# Patient Record
Sex: FEMALE | Race: WHITE | Employment: OTHER | URBAN - METROPOLITAN AREA
[De-identification: names, ages, dates, MRNs, and addresses within clinical notes are randomized per-mention and may not be internally consistent; named-entity substitution may affect disease eponyms.]

---

## 2017-01-04 ENCOUNTER — TELEPHONE (OUTPATIENT)
Dept: FAMILY MEDICINE CLINIC | Age: 72
End: 2017-01-04

## 2017-01-04 DIAGNOSIS — Z12.31 ENCOUNTER FOR SCREENING MAMMOGRAM FOR MALIGNANT NEOPLASM OF BREAST: Primary | ICD-10-CM

## 2017-01-06 RX ORDER — REPAGLINIDE 2 MG/1
TABLET ORAL
Qty: 90 TABLET | Refills: 3 | Status: SHIPPED | OUTPATIENT
Start: 2017-01-06 | End: 2017-05-15 | Stop reason: SDUPTHER

## 2017-01-13 RX ORDER — LOSARTAN POTASSIUM 100 MG/1
TABLET ORAL
Qty: 90 TABLET | Refills: 0 | Status: SHIPPED | OUTPATIENT
Start: 2017-01-13 | End: 2017-04-10 | Stop reason: SDUPTHER

## 2017-01-16 ENCOUNTER — HOSPITAL ENCOUNTER (OUTPATIENT)
Dept: WOMENS IMAGING | Age: 72
Discharge: HOME | End: 2017-01-16
Payer: MEDICARE

## 2017-01-16 DIAGNOSIS — Z12.31 ENCOUNTER FOR SCREENING MAMMOGRAM FOR MALIGNANT NEOPLASM OF BREAST: ICD-10-CM

## 2017-01-16 PROCEDURE — G0202 SCR MAMMO BI INCL CAD: HCPCS

## 2017-04-10 RX ORDER — LOSARTAN POTASSIUM 100 MG/1
TABLET ORAL
Qty: 90 TABLET | Refills: 1 | Status: SHIPPED | OUTPATIENT
Start: 2017-04-10 | End: 2017-10-06 | Stop reason: SDUPTHER

## 2017-04-18 ENCOUNTER — OFFICE VISIT (OUTPATIENT)
Dept: FAMILY MEDICINE CLINIC | Age: 72
End: 2017-04-18

## 2017-04-18 VITALS
WEIGHT: 124 LBS | DIASTOLIC BLOOD PRESSURE: 70 MMHG | BODY MASS INDEX: 24.35 KG/M2 | SYSTOLIC BLOOD PRESSURE: 132 MMHG | RESPIRATION RATE: 12 BRPM | HEIGHT: 60 IN | HEART RATE: 70 BPM | TEMPERATURE: 97.2 F

## 2017-04-18 DIAGNOSIS — E55.9 VITAMIN D DEFICIENCY: ICD-10-CM

## 2017-04-18 DIAGNOSIS — I77.9 CAROTID DISEASE, BILATERAL (HCC): ICD-10-CM

## 2017-04-18 DIAGNOSIS — E78.2 MIXED HYPERLIPIDEMIA: ICD-10-CM

## 2017-04-18 DIAGNOSIS — I10 ESSENTIAL HYPERTENSION: ICD-10-CM

## 2017-04-18 DIAGNOSIS — D64.9 ANEMIA, UNSPECIFIED TYPE: ICD-10-CM

## 2017-04-18 PROCEDURE — 1036F TOBACCO NON-USER: CPT | Performed by: FAMILY MEDICINE

## 2017-04-18 PROCEDURE — 99214 OFFICE O/P EST MOD 30 MIN: CPT | Performed by: FAMILY MEDICINE

## 2017-04-18 PROCEDURE — 3044F HG A1C LEVEL LT 7.0%: CPT | Performed by: FAMILY MEDICINE

## 2017-04-18 PROCEDURE — 1090F PRES/ABSN URINE INCON ASSESS: CPT | Performed by: FAMILY MEDICINE

## 2017-04-18 PROCEDURE — 4040F PNEUMOC VAC/ADMIN/RCVD: CPT | Performed by: FAMILY MEDICINE

## 2017-04-18 PROCEDURE — G8420 CALC BMI NORM PARAMETERS: HCPCS | Performed by: FAMILY MEDICINE

## 2017-04-18 PROCEDURE — G8427 DOCREV CUR MEDS BY ELIG CLIN: HCPCS | Performed by: FAMILY MEDICINE

## 2017-04-18 PROCEDURE — G8400 PT W/DXA NO RESULTS DOC: HCPCS | Performed by: FAMILY MEDICINE

## 2017-04-18 PROCEDURE — 1123F ACP DISCUSS/DSCN MKR DOCD: CPT | Performed by: FAMILY MEDICINE

## 2017-04-18 PROCEDURE — 3014F SCREEN MAMMO DOC REV: CPT | Performed by: FAMILY MEDICINE

## 2017-04-18 PROCEDURE — 3017F COLORECTAL CA SCREEN DOC REV: CPT | Performed by: FAMILY MEDICINE

## 2017-04-18 ASSESSMENT — PATIENT HEALTH QUESTIONNAIRE - PHQ9
1. LITTLE INTEREST OR PLEASURE IN DOING THINGS: 0
2. FEELING DOWN, DEPRESSED OR HOPELESS: 0
SUM OF ALL RESPONSES TO PHQ9 QUESTIONS 1 & 2: 0
SUM OF ALL RESPONSES TO PHQ QUESTIONS 1-9: 0

## 2017-05-05 ENCOUNTER — NURSE ONLY (OUTPATIENT)
Dept: FAMILY MEDICINE CLINIC | Age: 72
End: 2017-05-05

## 2017-05-05 DIAGNOSIS — E78.2 MIXED HYPERLIPIDEMIA: ICD-10-CM

## 2017-05-05 DIAGNOSIS — E55.9 VITAMIN D DEFICIENCY: ICD-10-CM

## 2017-05-05 DIAGNOSIS — I10 ESSENTIAL HYPERTENSION: ICD-10-CM

## 2017-05-05 DIAGNOSIS — Z12.11 COLON CANCER SCREENING: Primary | ICD-10-CM

## 2017-05-05 LAB
ALBUMIN SERPL-MCNC: 4.2 G/DL (ref 3.9–4.9)
ALP BLD-CCNC: 52 U/L (ref 40–130)
ALT SERPL-CCNC: 15 U/L (ref 0–33)
ANION GAP SERPL CALCULATED.3IONS-SCNC: 10 MEQ/L (ref 7–13)
AST SERPL-CCNC: 17 U/L (ref 0–35)
BILIRUB SERPL-MCNC: 0.4 MG/DL (ref 0–1.2)
BUN BLDV-MCNC: 15 MG/DL (ref 8–23)
CALCIUM SERPL-MCNC: 8.9 MG/DL (ref 8.6–10.2)
CHLORIDE BLD-SCNC: 104 MEQ/L (ref 98–107)
CHOLESTEROL, TOTAL: 218 MG/DL (ref 0–199)
CO2: 27 MEQ/L (ref 22–29)
CONTROL: PRESENT
CREAT SERPL-MCNC: 0.75 MG/DL (ref 0.5–0.9)
GFR AFRICAN AMERICAN: >60
GFR NON-AFRICAN AMERICAN: >60
GLOBULIN: 2.3 G/DL (ref 2.3–3.5)
GLUCOSE BLD-MCNC: 159 MG/DL (ref 74–109)
HBA1C MFR BLD: 6.9 % (ref 4.8–5.9)
HCT VFR BLD CALC: 40.8 % (ref 37–47)
HDLC SERPL-MCNC: 70 MG/DL (ref 40–59)
HEMOCCULT STL QL: NEGATIVE
HEMOGLOBIN: 13.4 G/DL (ref 12–16)
LDL CHOLESTEROL CALCULATED: 128 MG/DL (ref 0–129)
MCH RBC QN AUTO: 31.1 PG (ref 27–31.3)
MCHC RBC AUTO-ENTMCNC: 32.8 % (ref 33–37)
MCV RBC AUTO: 94.8 FL (ref 82–100)
PDW BLD-RTO: 12.1 % (ref 11.5–14.5)
PLATELET # BLD: 173 K/UL (ref 130–400)
POTASSIUM SERPL-SCNC: 3.8 MEQ/L (ref 3.5–5.1)
RBC # BLD: 4.31 M/UL (ref 4.2–5.4)
SODIUM BLD-SCNC: 141 MEQ/L (ref 132–144)
T4 FREE: 1.07 NG/DL (ref 0.93–1.7)
TOTAL PROTEIN: 6.5 G/DL (ref 6.4–8.1)
TRIGL SERPL-MCNC: 100 MG/DL (ref 0–200)
TSH SERPL DL<=0.05 MIU/L-ACNC: 4.45 UIU/ML (ref 0.27–4.2)
VITAMIN D 25-HYDROXY: 36.3 NG/ML (ref 30–100)
WBC # BLD: 4.3 K/UL (ref 4.8–10.8)

## 2017-05-05 PROCEDURE — 82274 ASSAY TEST FOR BLOOD FECAL: CPT | Performed by: FAMILY MEDICINE

## 2017-05-15 ENCOUNTER — OFFICE VISIT (OUTPATIENT)
Dept: SURGERY | Age: 72
End: 2017-05-15

## 2017-05-15 VITALS
HEART RATE: 81 BPM | SYSTOLIC BLOOD PRESSURE: 170 MMHG | BODY MASS INDEX: 23.95 KG/M2 | WEIGHT: 122 LBS | DIASTOLIC BLOOD PRESSURE: 70 MMHG | HEIGHT: 60 IN

## 2017-05-15 PROCEDURE — 99213 OFFICE O/P EST LOW 20 MIN: CPT | Performed by: INTERNAL MEDICINE

## 2017-05-15 RX ORDER — REPAGLINIDE 2 MG/1
TABLET ORAL
Qty: 270 TABLET | Refills: 3 | Status: SHIPPED | OUTPATIENT
Start: 2017-05-15 | End: 2018-09-10 | Stop reason: SDUPTHER

## 2017-05-20 ENCOUNTER — HOSPITAL ENCOUNTER (OUTPATIENT)
Dept: ULTRASOUND IMAGING | Age: 72
Discharge: HOME | End: 2017-05-20
Payer: MEDICARE

## 2017-05-20 DIAGNOSIS — I77.9 CAROTID DISEASE, BILATERAL (HCC): ICD-10-CM

## 2017-05-20 PROCEDURE — 93880 EXTRACRANIAL BILAT STUDY: CPT

## 2017-05-22 DIAGNOSIS — I65.29 STENOSIS OF CAROTID ARTERY, UNSPECIFIED LATERALITY: Primary | ICD-10-CM

## 2017-05-22 RX ORDER — GLIMEPIRIDE 2 MG/1
TABLET ORAL
Qty: 60 TABLET | Refills: 3 | Status: SHIPPED | OUTPATIENT
Start: 2017-05-22 | End: 2017-09-19 | Stop reason: SDUPTHER

## 2017-07-14 RX ORDER — SITAGLIPTIN 100 MG/1
TABLET, FILM COATED ORAL
Qty: 90 TABLET | Refills: 0 | OUTPATIENT
Start: 2017-07-14

## 2017-07-21 DIAGNOSIS — E78.01 FAMILIAL HYPERCHOLESTEROLEMIA: ICD-10-CM

## 2017-07-21 RX ORDER — PRAVASTATIN SODIUM 20 MG
TABLET ORAL
Qty: 30 TABLET | Refills: 3 | Status: SHIPPED | OUTPATIENT
Start: 2017-07-21 | End: 2018-05-14 | Stop reason: DRUGHIGH

## 2017-09-19 RX ORDER — GLIMEPIRIDE 2 MG/1
TABLET ORAL
Qty: 180 TABLET | Refills: 3 | Status: SHIPPED | OUTPATIENT
Start: 2017-09-19 | End: 2018-08-24 | Stop reason: SDUPTHER

## 2017-09-19 RX ORDER — HYDROCHLOROTHIAZIDE 25 MG/1
25 TABLET ORAL DAILY
Qty: 90 TABLET | Refills: 1 | Status: SHIPPED | OUTPATIENT
Start: 2017-09-19 | End: 2019-02-25 | Stop reason: SDUPTHER

## 2017-10-06 RX ORDER — LOSARTAN POTASSIUM 100 MG/1
TABLET ORAL
Qty: 90 TABLET | Refills: 1 | Status: SHIPPED | OUTPATIENT
Start: 2017-10-06 | End: 2018-04-09 | Stop reason: SDUPTHER

## 2017-10-17 ENCOUNTER — OFFICE VISIT (OUTPATIENT)
Dept: FAMILY MEDICINE CLINIC | Age: 72
End: 2017-10-17

## 2017-10-17 VITALS
WEIGHT: 126 LBS | HEART RATE: 74 BPM | TEMPERATURE: 97.8 F | BODY MASS INDEX: 24.74 KG/M2 | SYSTOLIC BLOOD PRESSURE: 132 MMHG | RESPIRATION RATE: 14 BRPM | DIASTOLIC BLOOD PRESSURE: 68 MMHG | HEIGHT: 60 IN

## 2017-10-17 DIAGNOSIS — I10 ESSENTIAL HYPERTENSION: ICD-10-CM

## 2017-10-17 DIAGNOSIS — I65.21 STENOSIS OF RIGHT CAROTID ARTERY: ICD-10-CM

## 2017-10-17 DIAGNOSIS — E78.2 MIXED HYPERLIPIDEMIA: ICD-10-CM

## 2017-10-17 PROCEDURE — 99214 OFFICE O/P EST MOD 30 MIN: CPT | Performed by: FAMILY MEDICINE

## 2017-10-17 NOTE — PROGRESS NOTES
Chief Complaint   Patient presents with    Diabetes Mellitus    Hypertension    Hyperlipidemia     Lucy Bond is here for a diabetic follow up    TTL0H=0.9 and due    CYH=935    Sugars most recently have been running-   HIGH & LOW<160  Activity level-     advised to increase  The patient denies history of       seizures,             heart attack or KNOWN CAD       or stroke. No chest pain, shortness of breath, paroxysmal nocturnal dyspnea. No nausea, vomiting, diarrhea, hematochezia or melena. No paresthesias or headaches      No dysuria, frequency or hematuria. Past Medical History:   Diagnosis Date    Anemia     Fatigue     Fibroids     Hyperlipidemia     Hyperthyroidism     Palpitations     Sinusitis     Type II or unspecified type diabetes mellitus without mention of complication, not stated as uncontrolled      Past Surgical History:   Procedure Laterality Date    CYST REMOVAL Right 1966    HYSTERECTOMY  1996    TONSILLECTOMY  1949     Social History     Social History    Marital status:      Spouse name: N/A    Number of children: N/A    Years of education: N/A     Social History Main Topics    Smoking status: Never Smoker    Smokeless tobacco: Never Used    Alcohol use None    Drug use: Unknown    Sexual activity: Not Asked     Other Topics Concern    None     Social History Narrative    None     No visits with results within 3 Month(s) from this visit.    Latest known visit with results is:   Nurse Only on 05/05/2017   Component Date Value Ref Range Status    OCCULT BLOOD FECAL 05/05/2017 negative   Final    Control 05/05/2017 present   Final     Health Maintenance   Topic Date Due    Diabetic foot exam  05/12/2017    Diabetic retinal exam  08/08/2017    DEXA (modify frequency per FRAX score)  04/17/2018 (Originally 8/17/2009)    DTaP/Tdap/Td vaccine (1 - Tdap) 04/17/2018 (Originally 8/17/1964)    Pneumococcal low/med risk (1 of 2 - PCV13) 04/18/2018 (Originally 8/17/2009)    Diabetic microalbuminuria test  11/04/2017    Diabetic hemoglobin A1C test  05/05/2018    Lipid screen  05/05/2018    Colon Cancer Screen FIT/FOBT  05/05/2018    Breast cancer screen  01/16/2019    Zostavax vaccine  Addressed         Vitals:    10/17/17 0857   BP: 132/68   Pulse: 74   Resp: 14   Temp: 97.8 °F (36.6 °C)     PHYSICAL EXAMINATION:      -----------------------------------------------------------------------------------------------------  GENERAL:  The patient appears nourished     &  normal affect. No acute distress. Alert and oriented times 3. No obvious skin rashes or lesions. No gait disturbance. HEENT:   Normocephalic, atraumatic. Normal speech    Extraocular eye motions intact and pain free. Pupils reactive and equally round. Conjunctivae clear    TMs normal    No erythema pharynx      NECK:  No masses or adenopathy palpable. No asymmetry visible. No thyromegaly. FAINT POSSIBLE R bruit. RESPIRATORY:  Equal breath sounds / no acute respiratory distress. No wheezes,rales, or rhonchi        HEART:  Regular rhythm without murmur, rub or gallop. ABDOMEN:  Soft, nontender. No masses, guarding or rebound. Normoactive bowel sounds. EXTREMITIES:   No edema in any extremity. No cyanosis or clubbing. 2+ dorsalis pedis pulses bilaterally        FOOT EXAM:    A comprehensive foot exam was declined denies lesions    1. Uncontrolled type 2 diabetes mellitus without complication, without long-term current use of insulin (HCC)   DIABETES FOOT EXAM    Microalbumin / Creatinine Urine Ratio   2. Essential hypertension     3. Mixed hyperlipidemia     4.  Stenosis of right carotid artery       Aware of carotid disease  Referred to dr Valdes  Signs/sxs  necessitating er d/w pt  Sees her yearly  Needs to see [facility] perhaps bc of insurance    And again had a lengthy discussion w pt  about risks of poorly controlled diabetes including micro and macrovascular complications of DM2 including blindness,MI,CVA and death among other possibilities. Pt aware and agrees to better compliance and adherance to instructions such as regular eye exams q 1-2 y, foot exams,and f/u regularly for hba1c with a goal of 6.5.     NO evidence of neuropathy or nephropathy at this point    Follow up as planned for hba1c and BP checks    Sooner if condition deteriorates or problems arise    Sarah Garcia MD

## 2017-10-17 NOTE — PATIENT INSTRUCTIONS
Patient Education        Carotid Stenosis: Care Instructions  Your Care Instructions    Carotid stenosis is narrowing of one or both of the carotid arteries. These arteries take blood from the heart to the brain. There is one on each side of the neck. A substance called plaque builds up inside an artery. This makes it too narrow. Plaque comes from damage to the artery over time. This damage may be caused by high blood pressure, high cholesterol, diabetes, or smoking. Sometimes plaque can break loose from the carotid artery and move to the brain. This can cause a stroke or transient ischemic attack (TIA). The goal of treatment is to lower your risk of having a stroke or TIA. You can lower your risk by making healthy lifestyle changes and taking medicine. Sometimes a surgery or procedure is done. Follow-up care is a key part of your treatment and safety. Be sure to make and go to all appointments, and call your doctor if you are having problems. It's also a good idea to know your test results and keep a list of the medicines you take. How can you care for yourself at home? · Take your medicines exactly as prescribed. Call your doctor if you think you are having a problem with your medicine. You may take medicine to lower your blood pressure, to lower your cholesterol, or to prevent blood clots. · If you take a blood thinner, such as aspirin, be sure to get instructions about how to take your medicine safely. Blood thinners can cause serious bleeding problems. · Do not smoke. People who smoke have a higher chance of stroke than those who quit. If you need help quitting, talk to your doctor about stop-smoking programs and medicines. These can increase your chances of quitting for good. · Eat a healthy diet that is low in saturated fat and salt. Eat lots of fresh fruits and vegetables and foods high in fiber. · Stay at a healthy weight. Lose weight if you need to.   · Talk to your doctor about starting an exercise program. Regular exercise lowers your chance of stroke. · Limit alcohol to 2 drinks a day for men and 1 drink a day for women. Too much alcohol can cause health problems. · Work with your doctor to control high blood pressure, high cholesterol, diabetes, and other conditions that increase your chance of a stroke. A healthy diet, exercise, weight loss (if needed), and medicines can help. · Avoid colds and flu. Get the flu vaccine every year. When should you call for help? Call 911 anytime you think you may need emergency care. For example, call if:  · You passed out (lost consciousness). · You have symptoms of a stroke. These may include:  ¨ Sudden numbness, tingling, weakness, or loss of movement in your face, arm, or leg, especially on only one side of your body. ¨ Sudden vision changes. ¨ Sudden trouble speaking. ¨ Sudden confusion or trouble understanding simple statements. ¨ Sudden problems with walking or balance. ¨ A sudden, severe headache that is different from past headaches. Call your doctor now or seek immediate medical care if:  · You are dizzy or lightheaded, or you feel like you may faint. Watch closely for changes in your health, and be sure to contact your doctor if you have any problems. Where can you learn more? Go to [email or web address]://[email or web address].[email or web address] org and sign in to your [employer] Local account. Enter J191 in the AKT box to learn more about \"Carotid Stenosis: Care Instructions. \"     If you do not have an account, please click on the \"Sign Up Now\" link. Current as of: April 3, 2017  Content Version: 11.3  © 2336-1443 [employer]. Care instructions adapted under license by [facility] ([facility]). If you have questions about a medical condition or this instruction, always ask your healthcare professional. [place] [address] any warranty or liability for your use of this information.

## 2017-11-03 DIAGNOSIS — I10 ESSENTIAL HYPERTENSION: ICD-10-CM

## 2017-11-03 DIAGNOSIS — E78.2 MIXED HYPERLIPIDEMIA: ICD-10-CM

## 2017-11-03 LAB
ALBUMIN SERPL-MCNC: 4.5 G/DL (ref 3.9–4.9)
ALP BLD-CCNC: 54 U/L (ref 40–130)
ALT SERPL-CCNC: 19 U/L (ref 0–33)
ANION GAP SERPL CALCULATED.3IONS-SCNC: 15 MEQ/L (ref 7–13)
AST SERPL-CCNC: 19 U/L (ref 0–35)
BILIRUB SERPL-MCNC: 0.4 MG/DL (ref 0–1.2)
BUN BLDV-MCNC: 13 MG/DL (ref 8–23)
CALCIUM SERPL-MCNC: 9.2 MG/DL (ref 8.6–10.2)
CHLORIDE BLD-SCNC: 104 MEQ/L (ref 98–107)
CHOLESTEROL, TOTAL: 213 MG/DL (ref 0–199)
CO2: 26 MEQ/L (ref 22–29)
CREAT SERPL-MCNC: 0.72 MG/DL (ref 0.5–0.9)
CREATININE URINE: 93.6 MG/DL
GFR AFRICAN AMERICAN: >60
GFR NON-AFRICAN AMERICAN: >60
GLOBULIN: 2.4 G/DL (ref 2.3–3.5)
GLUCOSE BLD-MCNC: 142 MG/DL (ref 74–109)
HBA1C MFR BLD: 7.6 % (ref 4.8–5.9)
HCT VFR BLD CALC: 39.7 % (ref 37–47)
HDLC SERPL-MCNC: 71 MG/DL (ref 40–59)
HEMOGLOBIN: 13.2 G/DL (ref 12–16)
LDL CHOLESTEROL CALCULATED: 125 MG/DL (ref 0–129)
MCH RBC QN AUTO: 30.7 PG (ref 27–31.3)
MCHC RBC AUTO-ENTMCNC: 33.1 % (ref 33–37)
MCV RBC AUTO: 92.5 FL (ref 82–100)
MICROALBUMIN UR-MCNC: 2 MG/DL
MICROALBUMIN/CREAT UR-RTO: 21.4 MG/G (ref 0–30)
PDW BLD-RTO: 12.2 % (ref 11.5–14.5)
PLATELET # BLD: 205 K/UL (ref 130–400)
POTASSIUM SERPL-SCNC: 4.3 MEQ/L (ref 3.5–5.1)
RBC # BLD: 4.29 M/UL (ref 4.2–5.4)
SODIUM BLD-SCNC: 145 MEQ/L (ref 132–144)
T4 FREE: 1.06 NG/DL (ref 0.93–1.7)
TOTAL PROTEIN: 6.9 G/DL (ref 6.4–8.1)
TRIGL SERPL-MCNC: 84 MG/DL (ref 0–200)
TSH SERPL DL<=0.05 MIU/L-ACNC: 4.89 UIU/ML (ref 0.27–4.2)
WBC # BLD: 4.2 K/UL (ref 4.8–10.8)

## 2017-11-07 RX ORDER — PRAVASTATIN SODIUM 40 MG
40 TABLET ORAL EVERY EVENING
Qty: 30 TABLET | Refills: 3 | Status: SHIPPED | OUTPATIENT
Start: 2017-11-07 | End: 2018-09-20 | Stop reason: SDUPTHER

## 2017-11-14 ENCOUNTER — OFFICE VISIT (OUTPATIENT)
Dept: SURGERY | Age: 72
End: 2017-11-14

## 2017-11-14 VITALS
WEIGHT: 126 LBS | HEIGHT: 60 IN | SYSTOLIC BLOOD PRESSURE: 164 MMHG | DIASTOLIC BLOOD PRESSURE: 81 MMHG | HEART RATE: 79 BPM | BODY MASS INDEX: 24.74 KG/M2

## 2017-11-14 PROCEDURE — 99213 OFFICE O/P EST LOW 20 MIN: CPT | Performed by: INTERNAL MEDICINE

## 2017-11-14 NOTE — PROGRESS NOTES
tablet by mouth twice daily with meals. , Disp: 180 tablet, Rfl: 3    pravastatin (PRAVACHOL) 20 MG tablet, Take one tablet on Mond-Wed- and Friday, Disp: 30 tablet, Rfl: 3    SITagliptin (JANUVIA) 100 MG tablet, TAKE 1 TABLET DAILY, Disp: 90 tablet, Rfl: 1    repaglinide (PRANDIN) 2 MG tablet, Tid, Disp: 270 tablet, Rfl: 3    amLODIPine (NORVASC) 5 MG tablet, Take 1 tablet by mouth daily, Disp: 90 tablet, Rfl: 3    glucose blood VI test strips (ONE TOUCH TEST STRIPS) strip, Pt test 1x qd dx E11.65, Disp: 100 each, Rfl: 3    Ascorbic Acid (VITAMIN C) 500 MG tablet, Take 500 mg by mouth 2 times daily. , Disp: , Rfl:     vitamin D (CHOLECALCIFEROL) 1000 UNIT TABS tablet, Take 1,000 Units by mouth daily. , Disp: , Rfl:     ONETOUCH DELICA LANCETS MISC, Test bid, Disp: 100 each, Rfl: 11    Alcohol Swabs (ALCOHOL PREP SWABS) PADS, by Does not apply route., Disp: 100 each, Rfl: 11    Cyanocobalamin (VITAMIN B 12 PO), Take  by mouth.  , Disp: , Rfl:     vitamin E 100 UNITS capsule, Take 800 Units by mouth daily. , Disp: , Rfl:     Multiple Vitamin (DAILY VITAMIN PO), Take  by mouth.  , Disp: , Rfl:     fish oil-omega-3 fatty acids 1000 MG capsule, Take 1 g by mouth daily. , Disp: , Rfl:       Review of Systems   Constitutional: Positive for fatigue. Neurological: Positive for dizziness and tremors. Psychiatric/Behavioral: The patient is nervous/anxious. All other systems reviewed and are negative. Vitals:    11/14/17 0907 11/14/17 0914   BP: (!) 162/80 (!) 164/81   Site: Left Arm    Position: Sitting    Cuff Size: Medium Adult    Pulse: 79    Weight: 126 lb (57.2 kg)    Height: 5' (1.524 m)        Objective:   Physical Exam   Constitutional: She appears well-developed and well-nourished. HENT:   Head: Normocephalic and atraumatic. Neck: Neck supple. Cardiovascular: Normal rate and normal heart sounds.     Pulmonary/Chest: Effort normal and breath sounds normal.   Musculoskeletal: Normal range of motion. Neurological: She is alert. Skin: Skin is warm. Psychiatric: She has a normal mood and affect. Results for Urmila Beckwith (MRN [id number]) as of 11/14/2017 09:29   Ref.  Range 11/3/2017 07:34 11/3/2017 07:36   Sodium Latest Ref Range: 132 - 144 mEq/L 145 (H)    Potassium Latest Ref Range: 3.5 - 5.1 mEq/L 4.3    Chloride Latest Ref Range: 98 - 107 mEq/L 104    CO2 Latest Ref Range: 22 - 29 mEq/L 26    BUN Latest Ref Range: 8 - 23 mg/dL 13    Creatinine Latest Ref Range: 0.50 - 0.90 mg/dL 0.72    Anion Gap Latest Ref Range: 7 - 13 mEq/L 15 (H)    GFR Non- Latest Ref Range: >60  >60.0    GFR African American Latest Ref Range: >60  >60.0    Glucose Latest Ref Range: 74 - 109 mg/dL 142 (H)    Calcium Latest Ref Range: 8.6 - 10.2 mg/dL 9.2    Total Protein Latest Ref Range: 6.4 - 8.1 g/dL 6.9    Cholesterol, Total Latest Ref Range: 0 - 199 mg/dL  213 (H)   HDL Cholesterol Latest Ref Range: 40 - 59 mg/dL  71 (H)   LDL Calculated Latest Ref Range: 0 - 129 mg/dL  125   Triglycerides Latest Ref Range: 0 - 200 mg/dL  84   Albumin Latest Ref Range: 3.9 - 4.9 g/dL 4.5    Globulin Latest Ref Range: 2.3 - 3.5 g/dL 2.4    Alk Phos Latest Ref Range: 40 - 130 U/L 54    ALT Latest Ref Range: 0 - 33 U/L 19    AST Latest Ref Range: 0 - 35 U/L 19    Bilirubin Latest Ref Range: 0.0 - 1.2 mg/dL 0.4    Hemoglobin A1C Latest Ref Range: 4.8 - 5.9 % 7.6 (H)    TSH Latest Ref Range: 0.270 - 4.200 uIU/mL  4.890 (H)   T4 Free Latest Ref Range: 0.93 - 1.70 ng/dL  1.06   WBC Latest Ref Range: 4.8 - 10.8 K/uL  4.2 (L)   RBC Latest Ref Range: 4.20 - 5.40 M/uL  4.29   Hemoglobin Quant Latest Ref Range: 12.0 - 16.0 g/dL  13.2   Hematocrit Latest Ref Range: 37.0 - 47.0 %  39.7   MCV Latest Ref Range: 82.0 - 100.0 fL  92.5   MCH Latest Ref Range: 27.0 - 31.3 pg  30.7   MCHC Latest Ref Range: 33.0 - 37.0 %  33.1   RDW Latest Ref Range: 11.5 - 14.5 %  12.2   Platelet Count Latest Ref Range: 130 - 400 K/uL  205   Creatinine,

## 2017-11-16 DIAGNOSIS — E11.9 TYPE 2 DIABETES MELLITUS WITHOUT COMPLICATION, WITHOUT LONG-TERM CURRENT USE OF INSULIN (HCC): ICD-10-CM

## 2017-11-17 RX ORDER — AMLODIPINE BESYLATE 5 MG/1
5 TABLET ORAL DAILY
Qty: 90 TABLET | Refills: 3 | Status: SHIPPED | OUTPATIENT
Start: 2017-11-17 | End: 2018-08-24 | Stop reason: SDUPTHER

## 2018-01-17 ENCOUNTER — TELEPHONE (OUTPATIENT)
Dept: FAMILY MEDICINE CLINIC | Age: 73
End: 2018-01-17

## 2018-01-17 DIAGNOSIS — Z12.31 VISIT FOR SCREENING MAMMOGRAM: Primary | ICD-10-CM

## 2018-02-09 ENCOUNTER — HOSPITAL ENCOUNTER (OUTPATIENT)
Dept: WOMENS IMAGING | Age: 73
Discharge: HOME | End: 2018-02-11
Payer: MEDICARE

## 2018-02-09 DIAGNOSIS — Z12.31 VISIT FOR SCREENING MAMMOGRAM: ICD-10-CM

## 2018-02-09 PROCEDURE — 77067 SCR MAMMO BI INCL CAD: CPT

## 2018-04-09 RX ORDER — LOSARTAN POTASSIUM 100 MG/1
TABLET ORAL
Qty: 90 TABLET | Refills: 3 | Status: SHIPPED | OUTPATIENT
Start: 2018-04-09 | End: 2019-02-05 | Stop reason: SDUPTHER

## 2018-04-17 ENCOUNTER — OFFICE VISIT (OUTPATIENT)
Dept: FAMILY MEDICINE CLINIC | Age: 73
End: 2018-04-17
Payer: MEDICARE

## 2018-04-17 VITALS
WEIGHT: 124 LBS | TEMPERATURE: 97.9 F | DIASTOLIC BLOOD PRESSURE: 68 MMHG | BODY MASS INDEX: 24.35 KG/M2 | HEIGHT: 60 IN | HEART RATE: 80 BPM | SYSTOLIC BLOOD PRESSURE: 170 MMHG | RESPIRATION RATE: 16 BRPM

## 2018-04-17 DIAGNOSIS — I65.21 STENOSIS OF RIGHT CAROTID ARTERY: ICD-10-CM

## 2018-04-17 DIAGNOSIS — Z12.11 COLON CANCER SCREENING: ICD-10-CM

## 2018-04-17 DIAGNOSIS — D64.9 ANEMIA, UNSPECIFIED TYPE: ICD-10-CM

## 2018-04-17 DIAGNOSIS — I10 ESSENTIAL HYPERTENSION: ICD-10-CM

## 2018-04-17 DIAGNOSIS — E78.2 MIXED HYPERLIPIDEMIA: ICD-10-CM

## 2018-04-17 PROCEDURE — 99214 OFFICE O/P EST MOD 30 MIN: CPT | Performed by: FAMILY MEDICINE

## 2018-04-25 DIAGNOSIS — Z12.11 COLON CANCER SCREENING: ICD-10-CM

## 2018-04-25 LAB
CONTROL: PRESENT
HEMOCCULT STL QL: NEGATIVE

## 2018-04-25 PROCEDURE — 82274 ASSAY TEST FOR BLOOD FECAL: CPT | Performed by: FAMILY MEDICINE

## 2018-05-04 DIAGNOSIS — I10 ESSENTIAL HYPERTENSION: ICD-10-CM

## 2018-05-04 DIAGNOSIS — D64.9 ANEMIA, UNSPECIFIED TYPE: ICD-10-CM

## 2018-05-04 DIAGNOSIS — E78.2 MIXED HYPERLIPIDEMIA: ICD-10-CM

## 2018-05-04 LAB
ALBUMIN SERPL-MCNC: 4.4 G/DL (ref 3.9–4.9)
ALP BLD-CCNC: 53 U/L (ref 40–130)
ALT SERPL-CCNC: 19 U/L (ref 0–33)
ANION GAP SERPL CALCULATED.3IONS-SCNC: 15 MEQ/L (ref 7–13)
AST SERPL-CCNC: 20 U/L (ref 0–35)
BILIRUB SERPL-MCNC: 0.4 MG/DL (ref 0–1.2)
BUN BLDV-MCNC: 13 MG/DL (ref 8–23)
CALCIUM SERPL-MCNC: 9.4 MG/DL (ref 8.6–10.2)
CHLORIDE BLD-SCNC: 101 MEQ/L (ref 98–107)
CHOLESTEROL, TOTAL: 191 MG/DL (ref 0–199)
CO2: 27 MEQ/L (ref 22–29)
CREAT SERPL-MCNC: 0.89 MG/DL (ref 0.5–0.9)
GFR AFRICAN AMERICAN: >60
GFR NON-AFRICAN AMERICAN: >60
GLOBULIN: 2.2 G/DL (ref 2.3–3.5)
GLUCOSE BLD-MCNC: 171 MG/DL (ref 74–109)
HBA1C MFR BLD: 7.9 % (ref 4.8–5.9)
HCT VFR BLD CALC: 39.2 % (ref 37–47)
HDLC SERPL-MCNC: 69 MG/DL (ref 40–59)
HEMOGLOBIN: 13.1 G/DL (ref 12–16)
LDL CHOLESTEROL CALCULATED: 101 MG/DL (ref 0–129)
MCH RBC QN AUTO: 31.5 PG (ref 27–31.3)
MCHC RBC AUTO-ENTMCNC: 33.5 % (ref 33–37)
MCV RBC AUTO: 94.1 FL (ref 82–100)
PDW BLD-RTO: 12.4 % (ref 11.5–14.5)
PLATELET # BLD: 206 K/UL (ref 130–400)
POTASSIUM SERPL-SCNC: 3.8 MEQ/L (ref 3.5–5.1)
RBC # BLD: 4.17 M/UL (ref 4.2–5.4)
SODIUM BLD-SCNC: 143 MEQ/L (ref 132–144)
T4 FREE: 1.27 NG/DL (ref 0.93–1.7)
TOTAL PROTEIN: 6.6 G/DL (ref 6.4–8.1)
TRIGL SERPL-MCNC: 106 MG/DL (ref 0–200)
TSH SERPL DL<=0.05 MIU/L-ACNC: 4.85 UIU/ML (ref 0.27–4.2)
WBC # BLD: 4.3 K/UL (ref 4.8–10.8)

## 2018-05-14 ENCOUNTER — OFFICE VISIT (OUTPATIENT)
Dept: ENDOCRINOLOGY | Age: 73
End: 2018-05-14
Payer: MEDICARE

## 2018-05-14 VITALS
WEIGHT: 124 LBS | BODY MASS INDEX: 24.35 KG/M2 | SYSTOLIC BLOOD PRESSURE: 164 MMHG | HEART RATE: 82 BPM | HEIGHT: 60 IN | DIASTOLIC BLOOD PRESSURE: 76 MMHG

## 2018-05-14 DIAGNOSIS — E03.9 HYPOTHYROIDISM, UNSPECIFIED TYPE: ICD-10-CM

## 2018-05-14 PROCEDURE — 99213 OFFICE O/P EST LOW 20 MIN: CPT | Performed by: INTERNAL MEDICINE

## 2018-08-24 ENCOUNTER — OFFICE VISIT (OUTPATIENT)
Dept: FAMILY MEDICINE CLINIC | Age: 73
End: 2018-08-24
Payer: MEDICARE

## 2018-08-24 VITALS
WEIGHT: 124 LBS | BODY MASS INDEX: 24.35 KG/M2 | HEIGHT: 60 IN | TEMPERATURE: 98.1 F | HEART RATE: 72 BPM | SYSTOLIC BLOOD PRESSURE: 158 MMHG | DIASTOLIC BLOOD PRESSURE: 62 MMHG | RESPIRATION RATE: 16 BRPM

## 2018-08-24 DIAGNOSIS — I10 ESSENTIAL HYPERTENSION: ICD-10-CM

## 2018-08-24 DIAGNOSIS — E11.9 TYPE 2 DIABETES MELLITUS WITHOUT COMPLICATION, WITHOUT LONG-TERM CURRENT USE OF INSULIN (HCC): ICD-10-CM

## 2018-08-24 DIAGNOSIS — E78.2 MIXED HYPERLIPIDEMIA: ICD-10-CM

## 2018-08-24 DIAGNOSIS — D64.9 ANEMIA, UNSPECIFIED TYPE: ICD-10-CM

## 2018-08-24 LAB
ALBUMIN SERPL-MCNC: 4.3 G/DL (ref 3.9–4.9)
ALP BLD-CCNC: 55 U/L (ref 40–130)
ALT SERPL-CCNC: 21 U/L (ref 0–33)
ANION GAP SERPL CALCULATED.3IONS-SCNC: 13 MEQ/L (ref 7–13)
AST SERPL-CCNC: 21 U/L (ref 0–35)
BILIRUB SERPL-MCNC: 0.3 MG/DL (ref 0–1.2)
BUN BLDV-MCNC: 18 MG/DL (ref 8–23)
CALCIUM SERPL-MCNC: 9.3 MG/DL (ref 8.6–10.2)
CHLORIDE BLD-SCNC: 97 MEQ/L (ref 98–107)
CO2: 27 MEQ/L (ref 22–29)
CREAT SERPL-MCNC: 0.68 MG/DL (ref 0.5–0.9)
DIABETIC RETINOPATHY: POSITIVE
GFR AFRICAN AMERICAN: >60
GFR NON-AFRICAN AMERICAN: >60
GLOBULIN: 2.5 G/DL (ref 2.3–3.5)
GLUCOSE BLD-MCNC: 284 MG/DL (ref 74–109)
HBA1C MFR BLD: 8 % (ref 4.8–5.9)
POTASSIUM SERPL-SCNC: 4 MEQ/L (ref 3.5–5.1)
SODIUM BLD-SCNC: 137 MEQ/L (ref 132–144)
TOTAL PROTEIN: 6.8 G/DL (ref 6.4–8.1)

## 2018-08-24 PROCEDURE — 99214 OFFICE O/P EST MOD 30 MIN: CPT | Performed by: FAMILY MEDICINE

## 2018-08-24 RX ORDER — AMLODIPINE BESYLATE 5 MG/1
5 TABLET ORAL DAILY
Qty: 90 TABLET | Refills: 3 | Status: SHIPPED | OUTPATIENT
Start: 2018-08-24 | End: 2019-08-26

## 2018-08-24 RX ORDER — GLIMEPIRIDE 2 MG/1
TABLET ORAL
Qty: 180 TABLET | Refills: 3 | Status: SHIPPED | OUTPATIENT
Start: 2018-08-24 | End: 2018-08-29 | Stop reason: SDUPTHER

## 2018-08-24 ASSESSMENT — PATIENT HEALTH QUESTIONNAIRE - PHQ9
1. LITTLE INTEREST OR PLEASURE IN DOING THINGS: 0
SUM OF ALL RESPONSES TO PHQ QUESTIONS 1-9: 0
2. FEELING DOWN, DEPRESSED OR HOPELESS: 0
SUM OF ALL RESPONSES TO PHQ9 QUESTIONS 1 & 2: 0
SUM OF ALL RESPONSES TO PHQ QUESTIONS 1-9: 0

## 2018-08-24 NOTE — PATIENT INSTRUCTIONS
Patient Education        Back Strain: Care Instructions  Your Care Instructions    Back strain happens when you overstretch, or pull, a muscle in your back. You may hurt your back in an accident or when you exercise or lift something. Most back pain will get better with rest and time. You can take care of yourself at home to help your back heal.  Follow-up care is a key part of your treatment and safety. Be sure to make and go to all appointments, and call your doctor if you are having problems. It's also a good idea to know your test results and keep a list of the medicines you take. How can you care for yourself at home? · Try to stay as active as you can, but stop or reduce any activity that causes pain. · Put ice or a cold pack on the sore muscle for 10 to 20 minutes at a time to stop swelling. Try this every 1 to 2 hours for 3 days (when you are awake) or until the swelling goes down. Put a thin cloth between the ice pack and your skin. · After 2 or 3 days, apply a heating pad on low or a warm cloth to your back. Some doctors suggest that you go back and forth between hot and cold treatments. · Take pain medicines exactly as directed. ¨ If the doctor gave you a prescription medicine for pain, take it as prescribed. ¨ If you are not taking a prescription pain medicine, ask your doctor if you can take an over-the-counter medicine. · Try sleeping on your side with a pillow between your legs. Or put a pillow under your knees when you lie on your back. These measures can ease pain in your lower back. · Return to your usual level of activity slowly. When should you call for help? Call 911 anytime you think you may need emergency care. For example, call if:    · You are unable to move a leg at all.   [facility] your doctor now or seek immediate medical care if:    · You have new or worse symptoms in your legs, belly, or buttocks. Symptoms may include:  ¨ Numbness or tingling. ¨ Weakness.   ¨ Pain.     · You lose bladder or bowel control.    Watch closely for changes in your health, and be sure to contact your doctor if:    · You have a fever, lose weight, or don't feel well.     · You are not getting better as expected. Where can you learn more? Go to https://raphael.ProPerforma. org and sign in to your RFMarq account. Enter B382 in the QMCODES box to learn more about \"Back Strain: Care Instructions. \"     If you do not have an account, please click on the \"Sign Up Now\" link. Current as of: November 29, 2017  Content Version: 11.7  © 3401-0297 [employer]. Care instructions adapted under license by [facility] ([facility]). If you have questions about a medical condition or this instruction, always ask your healthcare professional. [address] any warranty or liability for your use of this information. Patient Education        Strain or Sprain: Care Instructions  Your Care Instructions    A strain happens when you overstretch, or pull, a muscle. A sprain occurs when you stretch or tear a ligament, the tough tissue that connects one bone to another. These problems can happen when you exercise or lift something or when you are in an accident. Rest and other home care can help strains and sprains heal.  The doctor has checked you carefully, but problems can develop later. If you notice any problems or new symptoms,  get medical treatment right away. Follow-up care is a key part of your treatment and safety. Be sure to make and go to all appointments, and call your doctor if you are having problems. It's also a good idea to know your test results and keep a list of the medicines you take. How can you care for yourself at home? · If your doctor gave you a sling, splint, brace, or immobilizer, use it exactly as directed. · Rest the strained or sprained area, and follow your doctor's advice about when you can be active again.   · Put ice or a cold pack on the sore area for 10 to 20 minutes at a time to stop swelling. Try this every 1 to 2 hours for 3 days (when you are awake) or until the swelling goes down. Put a thin cloth between the ice pack and your skin. Keep your splint or brace dry. · Prop up a sore arm or leg on a pillow when you ice it or anytime you sit or lie down. Try to keep it higher than the level of your heart. This will help reduce swelling. · Take pain medicines exactly as directed. ¨ If the doctor gave you a prescription medicine for pain, take it as prescribed. ¨ If you are not taking a prescription pain medicine, ask your doctor if you can take an over-the-counter medicine. · Do exercises as directed by your doctor or physical therapist.  · Return to your usual level of activity slowly. · Do not do anything that makes the pain worse. When should you call for help? Call your doctor now or seek immediate medical care if:    · You have severe or increasing pain.     · You have tingling, weakness, or numbness in the area.     · The area turns cold or changes color.     · Your cast or splint feels too tight.     · You have symptoms of a blood clot, such as:  ¨ Pain in your calf, back of the knee, thigh, or groin. ¨ Redness and swelling in your leg or groin.     · You cannot move the strained part of your body.    Watch closely for changes in your health, and be sure to contact your doctor if:    · You do not get better as expected. Where can you learn more? Go to [email or web address]://[email or web address] org and sign in to your [employer] account. Enter P818 in the [facility] box to learn more about \"Strain or Sprain: Care Instructions. \"     If you do not have an account, please click on the \"Sign Up Now\" link. Current as of: November 29, 2017  Content Version: 11.7  © 6935-7610 [employer]. Care instructions adapted under license by [facility]  If you have questions about a medical condition or this instruction, always ask

## 2018-08-24 NOTE — PROGRESS NOTES
factor for CHD      LDL Calculated 05/04/2018 101  0 - 129 mg/dL Final    ATP III LDL Classification is Near Optimal.    WBC 05/04/2018 4.3* 4.8 - 10.8 K/uL Final    RBC 05/04/2018 4.17* 4.20 - 5.40 M/uL Final    Hemoglobin 05/04/2018 13.1  12.0 - 16.0 g/dL Final    Hematocrit 05/04/2018 39.2  37.0 - 47.0 % Final    MCV 05/04/2018 94.1  82.0 - 100.0 fL Final    MCH 05/04/2018 31.5* 27.0 - 31.3 pg Final    MCHC 05/04/2018 33.5  33.0 - 37.0 % Final    RDW 05/04/2018 12.4  11.5 - 14.5 % Final    Platelets 34/80/0467 206  130 - 400 K/uL Final    Hemoglobin A1C 05/04/2018 7.9* 4.8 - 5.9 % Final    Sodium 05/04/2018 143  132 - 144 mEq/L Final    Potassium 05/04/2018 3.8  3.5 - 5.1 mEq/L Final    Chloride 05/04/2018 101  98 - 107 mEq/L Final    CO2 05/04/2018 27  22 - 29 mEq/L Final    Anion Gap 05/04/2018 15* 7 - 13 mEq/L Final    Glucose 05/04/2018 171* 74 - 109 mg/dL Final    BUN 05/04/2018 13  8 - 23 mg/dL Final    CREATININE 05/04/2018 0.89  0.50 - 0.90 mg/dL Final    GFR Non- 05/04/2018 >60.0  >60 Final    Comment: >60 mL/min/1.73m2 EGFR, calc. for ages 25 and older using the  MDRD formula (not corrected for weight), is valid for stable  renal function.  GFR  05/04/2018 >60.0  >60 Final    Comment: >60 mL/min/1.73m2 EGFR, calc. for ages 25 and older using the  MDRD formula (not corrected for weight), is valid for stable  renal function.       Calcium 05/04/2018 9.4  8.6 - 10.2 mg/dL Final    Total Protein 05/04/2018 6.6  6.4 - 8.1 g/dL Final    Alb 05/04/2018 4.4  3.9 - 4.9 g/dL Final    Total Bilirubin 05/04/2018 0.4  0.0 - 1.2 mg/dL Final    Alkaline Phosphatase 05/04/2018 53  40 - 130 U/L Final    ALT 05/04/2018 19  0 - 33 U/L Final    AST 05/04/2018 20  0 - 35 U/L Final    Globulin 05/04/2018 2.2* 2.3 - 3.5 g/dL Final    T4 Free 05/04/2018 1.27  0.93 - 1.70 ng/dL Final    TSH 05/04/2018 4.850* 0.270 - 4.200 uIU/mL Final     Health Maintenance Topic Date Due    DTaP/Tdap/Td vaccine (1 - Tdap) 08/17/1963    Diabetic retinal exam  08/01/2018    Shingles Vaccine (1 of 2 - 2 Dose Series) 10/17/2018 (Originally 8/17/1994)    DEXA (modify frequency per FRAX score)  08/24/2019 (Originally 8/17/2009)    Pneumococcal low/med risk (1 of 2 - PCV13) 08/24/2019 (Originally 8/17/2009)    Diabetic microalbuminuria test  11/03/2018    Diabetic foot exam  04/17/2019    Colon Cancer Screen FIT/FOBT  04/25/2019    A1C test (Diabetic or Prediabetic)  05/04/2019    Lipid screen  05/04/2019    Potassium monitoring  05/04/2019    Creatinine monitoring  05/04/2019    Breast cancer screen  02/09/2020       No results found for this visit on 08/24/18. Objective      Wt Readings from Last 3 Encounters:   08/24/18 124 lb (56.2 kg)   05/14/18 124 lb (56.2 kg)   04/17/18 124 lb (56.2 kg)     Temp Readings from Last 3 Encounters:   08/24/18 98.1 °F (36.7 °C) (Oral)   04/17/18 97.9 °F (36.6 °C) (Oral)   10/17/17 97.8 °F (36.6 °C) (Temporal)     BP Readings from Last 3 Encounters:   08/24/18 (!) 158/62   05/14/18 (!) 164/76   04/17/18 (!) 170/68     Pulse Readings from Last 3 Encounters:   08/24/18 72   05/14/18 82   04/17/18 80       PHYSICAL EXAMINATION:        GENERAL:    The patient appears well nourished and well-developed,     Normal affect. Not appearing significantly anxious or depressed. No acute respiratory distress. Alert and oriented times 3. Skin:     No skin rashes. No concerning moles observed. Gait:    Normal gait. No ataxia. HEENT:  Normocephalic, atraumatic. Throat:  Pharynx is clear, no erythema/ edema or exudates   Ears:    TMs normal bilaterally. Canals and ears normal   Eyes:  Extraocular eye motions intact and pain free. Pupils reactive/equal    Sclerae and conjunctivae clear    NECK: No masses or adenopathy palpable. No carotid bruits heard. No asymmetry visible. No thyromegaly.     RESPIRATORY:   Clear/ Equal breath sounds /No acute respiratory distress. No wheezes,rales, or rhonchi. No percussive abnormalities    HEART: Regular rhythm without murmur, rub or gallop. ABDOMEN:  Soft, non tender. No masses, guarding or rebound. Normo active bowel sounds. EXTREMITIES:  No edema in any extremity. No cyanosis or clubbing. 2+ dorsalis pedis pulses bilaterally    FOOT EXAM:    A comprehensive foot exam was declined-denies lesions  Dr Pablito Medrano examines        Assessment & Plan    Diagnosis Orders   1. Uncontrolled type 2 diabetes mellitus without complication, without long-term current use of insulin (HCC)  glimepiride (AMARYL) 2 MG tablet   2. Essential hypertension     3. Mixed hyperlipidemia     4. Anemia, unspecified type     5. Type 2 diabetes mellitus without complication, without long-term current use of insulin (HCC)  amLODIPine (NORVASC) 5 MG tablet     Orders Placed This Encounter   Procedures    Hemoglobin A1C     Standing Status:   Future     Standing Expiration Date:   8/24/2019    Comprehensive Metabolic Panel     Standing Status:   Future     Standing Expiration Date:   8/24/2018     No orders of the defined types were placed in this encounter. There are no discontinued medications. No Follow-up on file. Josette Anders is advised to follow up ASAP if condition deteriorates or problems arise and if no information on test results to patient in the next 1 month they are advised to call us. And again had a lengthy discussion w pt  about risks of poorly controlled diabetes including micro and macrovascular complications of DM2 including blindness,MI,CVA and death among other possibilities. Pt aware and agrees to better compliance and adherance to instructions such as regular eye exams q 1-2 y, foot exams,and f/u regularly for hba1c with a goal of 6.5.   Sees ophtho  Baby asa-already on  occ r back pain-had sounds like strain lately  Exercises given  If worse-needs appt  Silver sneakers

## 2018-08-29 RX ORDER — GLIMEPIRIDE 2 MG/1
TABLET ORAL
Qty: 180 TABLET | Refills: 3
Start: 2018-08-29 | End: 2019-08-09 | Stop reason: SDUPTHER

## 2018-09-10 RX ORDER — REPAGLINIDE 2 MG/1
TABLET ORAL
Qty: 270 TABLET | Refills: 3 | Status: SHIPPED | OUTPATIENT
Start: 2018-09-10 | End: 2019-09-18 | Stop reason: SDUPTHER

## 2018-09-21 RX ORDER — PRAVASTATIN SODIUM 40 MG
40 TABLET ORAL EVERY EVENING
Qty: 90 TABLET | Refills: 3 | Status: SHIPPED | OUTPATIENT
Start: 2018-09-21 | End: 2019-02-21 | Stop reason: SDUPTHER

## 2018-11-02 DIAGNOSIS — E03.9 HYPOTHYROIDISM, UNSPECIFIED TYPE: ICD-10-CM

## 2018-11-02 LAB
ANION GAP SERPL CALCULATED.3IONS-SCNC: 16 MEQ/L (ref 7–13)
BUN BLDV-MCNC: 13 MG/DL (ref 8–23)
CALCIUM SERPL-MCNC: 9 MG/DL (ref 8.6–10.2)
CHLORIDE BLD-SCNC: 102 MEQ/L (ref 98–107)
CO2: 26 MEQ/L (ref 22–29)
CREAT SERPL-MCNC: 0.73 MG/DL (ref 0.5–0.9)
CREATININE URINE: 111.5 MG/DL
GFR AFRICAN AMERICAN: >60
GFR NON-AFRICAN AMERICAN: >60
GLUCOSE BLD-MCNC: 180 MG/DL (ref 74–109)
HBA1C MFR BLD: 8.2 % (ref 4.8–5.9)
MICROALBUMIN UR-MCNC: 2.5 MG/DL
MICROALBUMIN/CREAT UR-RTO: 22.4 MG/G (ref 0–30)
POTASSIUM SERPL-SCNC: 3.7 MEQ/L (ref 3.5–5.1)
SODIUM BLD-SCNC: 144 MEQ/L (ref 132–144)
T4 FREE: 1.24 NG/DL (ref 0.93–1.7)
TSH SERPL DL<=0.05 MIU/L-ACNC: 3.9 UIU/ML (ref 0.27–4.2)

## 2018-11-12 ENCOUNTER — OFFICE VISIT (OUTPATIENT)
Dept: ENDOCRINOLOGY | Age: 73
End: 2018-11-12
Payer: MEDICARE

## 2018-11-12 VITALS
HEIGHT: 60 IN | WEIGHT: 123 LBS | HEART RATE: 83 BPM | OXYGEN SATURATION: 98 % | DIASTOLIC BLOOD PRESSURE: 77 MMHG | BODY MASS INDEX: 24.15 KG/M2 | SYSTOLIC BLOOD PRESSURE: 160 MMHG

## 2018-11-12 PROCEDURE — 99213 OFFICE O/P EST LOW 20 MIN: CPT | Performed by: INTERNAL MEDICINE

## 2018-11-12 RX ORDER — PIOGLITAZONEHYDROCHLORIDE 30 MG/1
30 TABLET ORAL DAILY
Qty: 30 TABLET | Refills: 3 | Status: SHIPPED | OUTPATIENT
Start: 2018-11-12 | End: 2020-02-25

## 2018-11-12 NOTE — PROGRESS NOTES
Subjective:      Patient ID: Henok Fleming is a 75 y.o. female. 6 months follow-up on type 2 diabetes  Diabetes   She presents for her follow-up diabetic visit. Her disease course has been stable. Risk factors for coronary artery disease include diabetes mellitus and sedentary lifestyle. Current diabetic treatment includes oral agent (triple therapy) (Januvia Prandin plus glimepiride). Her overall blood glucose range is 140-180 mg/dl. (Lab Results       Component                Value               Date                       LABA1C                   8.2 (H)             11/02/2018            ) An ACE inhibitor/angiotensin II receptor blocker is being taken. Concern about cost of Januvia wants to try some other medication probably generic   reviewed labs   Results for Luis Carlos Karimi (MRN [id number]) as of 11/12/2018 09:28   Ref.  Range 11/2/2018 09:06   Sodium Latest Ref Range: 132 - 144 mEq/L 144   Potassium Latest Ref Range: 3.5 - 5.1 mEq/L 3.7   Chloride Latest Ref Range: 98 - 107 mEq/L 102   CO2 Latest Ref Range: 22 - 29 mEq/L 26   BUN Latest Ref Range: 8 - 23 mg/dL 13   Creatinine Latest Ref Range: 0.50 - 0.90 mg/dL 0.73   Anion Gap Latest Ref Range: 7 - 13 mEq/L 16 (H)   GFR Non- Latest Ref Range: >60  >60.0   GFR African American Latest Ref Range: >60  >60.0   Glucose Latest Ref Range: 74 - 109 mg/dL 180 (H)   Calcium Latest Ref Range: 8.6 - 10.2 mg/dL 9.0   Hemoglobin A1C Latest Ref Range: 4.8 - 5.9 % 8.2 (H)   TSH Latest Ref Range: 0.270 - 4.200 uIU/mL 3.900   T4 Free Latest Ref Range: 0.93 - 1.70 ng/dL 1.24   Creatinine, Ur Latest Ref Range: Not Established mg/dL 111.5   Microalbumin Creatinine Ratio Latest Ref Range: 0.0 - 30.0 mg/G 22.4   Microalbumin, Random Urine Latest Ref Range: Not Established mg/dL 2.50 (H)     Patient Active Problem List   Diagnosis    Mixed hyperlipidemia    Essential hypertension    Vitamin D deficiency    Anemia    Fibroids    Macular degeneration   

## 2019-02-04 ENCOUNTER — TELEPHONE (OUTPATIENT)
Dept: FAMILY MEDICINE CLINIC | Age: 74
End: 2019-02-04

## 2019-02-04 DIAGNOSIS — Z12.31 ENCOUNTER FOR SCREENING MAMMOGRAM FOR BREAST CANCER: Primary | ICD-10-CM

## 2019-02-05 RX ORDER — LOSARTAN POTASSIUM 100 MG/1
TABLET ORAL
Qty: 90 TABLET | Refills: 0 | Status: SHIPPED | OUTPATIENT
Start: 2019-02-05 | End: 2019-03-01 | Stop reason: SDUPTHER

## 2019-02-22 RX ORDER — PRAVASTATIN SODIUM 40 MG
40 TABLET ORAL EVERY EVENING
Qty: 90 TABLET | Refills: 1 | Status: SHIPPED | OUTPATIENT
Start: 2019-02-22 | End: 2020-02-25 | Stop reason: SDUPTHER

## 2019-02-25 ENCOUNTER — OFFICE VISIT (OUTPATIENT)
Dept: FAMILY MEDICINE CLINIC | Age: 74
End: 2019-02-25
Payer: MEDICARE

## 2019-02-25 VITALS
SYSTOLIC BLOOD PRESSURE: 169 MMHG | BODY MASS INDEX: 24.94 KG/M2 | TEMPERATURE: 98.1 F | HEIGHT: 60 IN | HEART RATE: 84 BPM | WEIGHT: 127 LBS | RESPIRATION RATE: 16 BRPM | DIASTOLIC BLOOD PRESSURE: 72 MMHG

## 2019-02-25 DIAGNOSIS — I10 ESSENTIAL HYPERTENSION: ICD-10-CM

## 2019-02-25 DIAGNOSIS — E78.2 MIXED HYPERLIPIDEMIA: ICD-10-CM

## 2019-02-25 DIAGNOSIS — Z12.31 ENCOUNTER FOR SCREENING MAMMOGRAM FOR BREAST CANCER: ICD-10-CM

## 2019-02-25 PROCEDURE — 99214 OFFICE O/P EST MOD 30 MIN: CPT | Performed by: FAMILY MEDICINE

## 2019-02-25 RX ORDER — HYDROCHLOROTHIAZIDE 25 MG/1
25 TABLET ORAL DAILY
Qty: 90 TABLET | Refills: 3 | Status: SHIPPED | OUTPATIENT
Start: 2019-02-25 | End: 2020-08-25 | Stop reason: SDUPTHER

## 2019-02-25 RX ORDER — AMLODIPINE BESYLATE 2.5 MG/1
2.5 TABLET ORAL 3 TIMES DAILY
Qty: 270 TABLET | Refills: 3 | Status: SHIPPED | OUTPATIENT
Start: 2019-02-25 | End: 2020-02-25 | Stop reason: SDUPTHER

## 2019-02-25 ASSESSMENT — PATIENT HEALTH QUESTIONNAIRE - PHQ9
SUM OF ALL RESPONSES TO PHQ9 QUESTIONS 1 & 2: 1
1. LITTLE INTEREST OR PLEASURE IN DOING THINGS: 0
SUM OF ALL RESPONSES TO PHQ QUESTIONS 1-9: 1
SUM OF ALL RESPONSES TO PHQ QUESTIONS 1-9: 1
2. FEELING DOWN, DEPRESSED OR HOPELESS: 1

## 2019-02-26 ENCOUNTER — HOSPITAL ENCOUNTER (OUTPATIENT)
Dept: WOMENS IMAGING | Age: 74
Discharge: HOME | End: 2019-02-28
Payer: MEDICARE

## 2019-02-26 DIAGNOSIS — Z12.31 ENCOUNTER FOR SCREENING MAMMOGRAM FOR BREAST CANCER: ICD-10-CM

## 2019-02-26 PROCEDURE — 77063 BREAST TOMOSYNTHESIS BI: CPT

## 2019-03-01 ENCOUNTER — TELEPHONE (OUTPATIENT)
Dept: FAMILY MEDICINE CLINIC | Age: 74
End: 2019-03-01

## 2019-03-04 DIAGNOSIS — Z12.11 COLON CANCER SCREENING: Primary | ICD-10-CM

## 2019-03-04 RX ORDER — LOSARTAN POTASSIUM 100 MG/1
100 TABLET ORAL DAILY
Qty: 90 TABLET | Refills: 2 | Status: SHIPPED | OUTPATIENT
Start: 2019-03-04 | End: 2019-12-18 | Stop reason: SDUPTHER

## 2019-05-03 LAB
ANION GAP SERPL CALCULATED.3IONS-SCNC: 13 MEQ/L (ref 9–15)
BUN BLDV-MCNC: 12 MG/DL (ref 8–23)
CALCIUM SERPL-MCNC: 8.6 MG/DL (ref 8.5–9.9)
CHLORIDE BLD-SCNC: 105 MEQ/L (ref 95–107)
CO2: 26 MEQ/L (ref 20–31)
CREAT SERPL-MCNC: 0.71 MG/DL (ref 0.5–0.9)
GFR AFRICAN AMERICAN: >60
GFR NON-AFRICAN AMERICAN: >60
GLUCOSE BLD-MCNC: 179 MG/DL (ref 70–99)
HBA1C MFR BLD: 8.3 % (ref 4.8–5.9)
POTASSIUM SERPL-SCNC: 3.7 MEQ/L (ref 3.4–4.9)
SODIUM BLD-SCNC: 144 MEQ/L (ref 135–144)

## 2019-05-14 ENCOUNTER — OFFICE VISIT (OUTPATIENT)
Dept: ENDOCRINOLOGY | Age: 74
End: 2019-05-14
Payer: MEDICARE

## 2019-05-14 VITALS
WEIGHT: 126 LBS | SYSTOLIC BLOOD PRESSURE: 145 MMHG | DIASTOLIC BLOOD PRESSURE: 67 MMHG | HEART RATE: 78 BPM | BODY MASS INDEX: 24.74 KG/M2 | HEIGHT: 60 IN

## 2019-05-14 PROCEDURE — 99213 OFFICE O/P EST LOW 20 MIN: CPT | Performed by: INTERNAL MEDICINE

## 2019-05-14 RX ORDER — ALOGLIPTIN 25 MG/1
25 TABLET, FILM COATED ORAL DAILY
Qty: 30 TABLET | Refills: 3 | Status: SHIPPED | OUTPATIENT
Start: 2019-05-14 | End: 2020-02-25

## 2019-05-14 RX ORDER — UBIQUINOL 100 MG
CAPSULE ORAL
Qty: 100 EACH | Refills: 6 | Status: SHIPPED | OUTPATIENT
Start: 2019-05-14

## 2019-05-14 NOTE — PROGRESS NOTES
and well-nourished. Cardiovascular: Normal rate. Pulmonary/Chest: Breath sounds normal.   Musculoskeletal: Normal range of motion. Feet:    Neurological: She is alert. Skin: Skin is warm. Psychiatric: She has a normal mood and affect. Assessment:       Diagnosis Orders   1.  Uncontrolled type 2 diabetes mellitus without complication, without long-term current use of insulin ([facility] [address]             Plan:      Orders Placed This Encounter   Procedures   Zion Santacruz MD, Family Medicine, [place]     Referral Priority:   Routine     Referral Type:   Eval and Treat     Referral Reason:   Specialty Services Required     Referred to Provider:   Anastasia Rodriguez MD     Requested Specialty:   Family Medicine     Number of Visits Requested:   1     Pt to see if alogliptin is less expensive  Continue Actos Prandin and glimepiride at current dosages    Orders Placed This Encounter   Medications    alogliptin (NESINA) 25 MG TABS tablet     Sig: Take 1 tablet by mouth daily     Dispense:  30 tablet     Refill:  3    SITagliptin (JANUVIA) 100 MG tablet     Sig: TAKE 1 TABLET DAILY     Dispense:  90 tablet     Refill:  1    blood glucose test strips (ONE TOUCH TEST STRIPS) strip     Sig: Pt test 1x qd dx E11.65     Dispense:  100 each     Refill:  3    Alcohol Swabs (ALCOHOL PREP) 70 % PADS     Sig: bid     Dispense:  100 each     Refill:  06     Follow-up in 6 months time          Sariah Wilkerson MD

## 2019-08-09 RX ORDER — GLIMEPIRIDE 2 MG/1
TABLET ORAL
Qty: 180 TABLET | Refills: 3 | Status: SHIPPED | OUTPATIENT
Start: 2019-08-09 | End: 2020-10-23 | Stop reason: SDUPTHER

## 2019-08-26 ENCOUNTER — OFFICE VISIT (OUTPATIENT)
Dept: FAMILY MEDICINE CLINIC | Age: 74
End: 2019-08-26
Payer: MEDICARE

## 2019-08-26 VITALS
HEART RATE: 70 BPM | BODY MASS INDEX: 25.13 KG/M2 | WEIGHT: 128 LBS | TEMPERATURE: 97.8 F | OXYGEN SATURATION: 99 % | DIASTOLIC BLOOD PRESSURE: 70 MMHG | RESPIRATION RATE: 14 BRPM | SYSTOLIC BLOOD PRESSURE: 124 MMHG | HEIGHT: 60 IN

## 2019-08-26 DIAGNOSIS — E78.2 MIXED HYPERLIPIDEMIA: ICD-10-CM

## 2019-08-26 DIAGNOSIS — I10 ESSENTIAL HYPERTENSION: ICD-10-CM

## 2019-08-26 PROCEDURE — 99214 OFFICE O/P EST MOD 30 MIN: CPT | Performed by: FAMILY MEDICINE

## 2019-08-26 NOTE — PROGRESS NOTES
UNITS capsule Take 800 Units by mouth daily.  Multiple Vitamin (DAILY VITAMIN PO) Take  by mouth.  fish oil-omega-3 fatty acids 1000 MG capsule Take 1 g by mouth daily. No current facility-administered medications for this visit. ROS  CONSTITUTIONAL: The patient denies fevers, chills, sweats and body ache. HEENT: Denies headache, blurry vision, eye pain, tinnitus, vertigo,  sore throat, neck or thyroid masses. RESPIRATORY: Denies cough, sputum, hemoptysis. CARDIAC: Denies chest pain, pressure, palpitations, Denies lower extremity edema. GASTROINTESTINAL: Denies abdominal pain, constipation, diarrhea, bleeding in the stools,   GENITOURINARY: Denies dysuria, hematuria, nocturia or frequency, urinary incontinence. NEUROLOGIC: Denies headaches, dizziness, syncope, weakness  MUSCULOSKELETAL: denies changes in range of motion, joint pain, stiffness. ENDOCRINOLOGY: Denies heat or cold intolerance. HEMATOLOGY: Denies easy bleeding or blood transfusion,anemia  DERMATOLOGY: Denies changes in moles or pigmentation changes. PSYCHIATRY: Denies depression, agitation or anxiety.     Past Medical History:   Diagnosis Date    Anemia     Fatigue     Fibroids     Hyperlipidemia     Hyperthyroidism     Palpitations     Sinusitis     Type II or unspecified type diabetes mellitus without mention of complication, not stated as uncontrolled         Past Surgical History:   Procedure Laterality Date    CYST REMOVAL Right 1966    HYSTERECTOMY  1996    TONSILLECTOMY  1949        Family History   Problem Relation Age of Onset    Cancer Other     Diabetes Other         Social History     Socioeconomic History    Marital status:      Spouse name: Not on file    Number of children: Not on file    Years of education: Not on file    Highest education level: Not on file   Occupational History    Not on file   Social Needs    Financial resource strain: Not on file    Food insecurity:

## 2019-09-03 ENCOUNTER — TELEPHONE (OUTPATIENT)
Dept: FAMILY MEDICINE CLINIC | Age: 74
End: 2019-09-03

## 2019-09-03 DIAGNOSIS — Z12.11 COLON CANCER SCREENING: Primary | ICD-10-CM

## 2019-09-03 LAB — DIABETIC RETINOPATHY: NEGATIVE

## 2019-09-11 ENCOUNTER — NURSE ONLY (OUTPATIENT)
Dept: FAMILY MEDICINE CLINIC | Age: 74
End: 2019-09-11
Payer: MEDICARE

## 2019-09-11 DIAGNOSIS — Z12.11 COLON CANCER SCREENING: ICD-10-CM

## 2019-09-11 LAB
CONTROL: NORMAL
HEMOCCULT STL QL: NEGATIVE

## 2019-09-11 PROCEDURE — 82274 ASSAY TEST FOR BLOOD FECAL: CPT | Performed by: FAMILY MEDICINE

## 2019-09-18 RX ORDER — REPAGLINIDE 2 MG/1
TABLET ORAL
Qty: 270 TABLET | Refills: 3 | Status: SHIPPED
Start: 2019-09-18 | End: 2020-02-25 | Stop reason: SDUPTHER

## 2019-09-27 RX ORDER — REPAGLINIDE 2 MG/1
TABLET ORAL
Qty: 270 TABLET | Refills: 4 | Status: SHIPPED | OUTPATIENT
Start: 2019-09-27 | End: 2020-11-17 | Stop reason: SDUPTHER

## 2019-11-08 LAB
ANION GAP SERPL CALCULATED.3IONS-SCNC: 13 MEQ/L (ref 9–15)
BUN BLDV-MCNC: 18 MG/DL (ref 8–23)
CALCIUM SERPL-MCNC: 9 MG/DL (ref 8.5–9.9)
CHLORIDE BLD-SCNC: 103 MEQ/L (ref 95–107)
CO2: 28 MEQ/L (ref 20–31)
CREAT SERPL-MCNC: 0.84 MG/DL (ref 0.5–0.9)
GFR AFRICAN AMERICAN: >60
GFR NON-AFRICAN AMERICAN: >60
GLUCOSE BLD-MCNC: 189 MG/DL (ref 70–99)
HBA1C MFR BLD: 8.5 % (ref 4.8–5.9)
POTASSIUM SERPL-SCNC: 4 MEQ/L (ref 3.4–4.9)
SODIUM BLD-SCNC: 144 MEQ/L (ref 135–144)

## 2019-11-19 ENCOUNTER — OFFICE VISIT (OUTPATIENT)
Dept: ENDOCRINOLOGY | Age: 74
End: 2019-11-19
Payer: MEDICARE

## 2019-11-19 VITALS
DIASTOLIC BLOOD PRESSURE: 81 MMHG | BODY MASS INDEX: 24.94 KG/M2 | WEIGHT: 127 LBS | HEIGHT: 60 IN | HEART RATE: 70 BPM | SYSTOLIC BLOOD PRESSURE: 159 MMHG

## 2019-11-19 PROCEDURE — 99213 OFFICE O/P EST LOW 20 MIN: CPT | Performed by: INTERNAL MEDICINE

## 2019-12-19 RX ORDER — LOSARTAN POTASSIUM 100 MG/1
100 TABLET ORAL DAILY
Qty: 90 TABLET | Refills: 0 | Status: SHIPPED | OUTPATIENT
Start: 2019-12-19 | End: 2019-12-22

## 2019-12-23 RX ORDER — LOSARTAN POTASSIUM 100 MG/1
100 TABLET ORAL DAILY
Qty: 90 TABLET | Refills: 1 | Status: SHIPPED | OUTPATIENT
Start: 2019-12-23 | End: 2020-02-25 | Stop reason: SDUPTHER

## 2020-02-25 ENCOUNTER — OFFICE VISIT (OUTPATIENT)
Dept: FAMILY MEDICINE CLINIC | Age: 75
End: 2020-02-25
Payer: MEDICARE

## 2020-02-25 VITALS
WEIGHT: 128 LBS | BODY MASS INDEX: 25.13 KG/M2 | HEART RATE: 77 BPM | HEIGHT: 60 IN | TEMPERATURE: 98.3 F | SYSTOLIC BLOOD PRESSURE: 136 MMHG | OXYGEN SATURATION: 99 % | DIASTOLIC BLOOD PRESSURE: 60 MMHG

## 2020-02-25 PROCEDURE — G0438 PPPS, INITIAL VISIT: HCPCS | Performed by: FAMILY MEDICINE

## 2020-02-25 PROCEDURE — 99214 OFFICE O/P EST MOD 30 MIN: CPT | Performed by: FAMILY MEDICINE

## 2020-02-25 RX ORDER — PRAVASTATIN SODIUM 40 MG
40 TABLET ORAL
Qty: 36 TABLET | Refills: 1 | Status: SHIPPED | OUTPATIENT
Start: 2020-02-26 | End: 2020-11-20 | Stop reason: SDUPTHER

## 2020-02-25 RX ORDER — LOSARTAN POTASSIUM 100 MG/1
100 TABLET ORAL DAILY
Qty: 90 TABLET | Refills: 1 | Status: SHIPPED | OUTPATIENT
Start: 2020-02-25 | End: 2020-06-24

## 2020-02-25 RX ORDER — AMLODIPINE BESYLATE 2.5 MG/1
TABLET ORAL
Qty: 270 TABLET | Refills: 1 | Status: SHIPPED | OUTPATIENT
Start: 2020-02-25 | End: 2020-08-25 | Stop reason: SDUPTHER

## 2020-02-25 ASSESSMENT — PATIENT HEALTH QUESTIONNAIRE - PHQ9
SUM OF ALL RESPONSES TO PHQ9 QUESTIONS 1 & 2: 0
SUM OF ALL RESPONSES TO PHQ QUESTIONS 1-9: 0
1. LITTLE INTEREST OR PLEASURE IN DOING THINGS: 0
2. FEELING DOWN, DEPRESSED OR HOPELESS: 0
SUM OF ALL RESPONSES TO PHQ QUESTIONS 1-9: 0

## 2020-02-25 ASSESSMENT — LIFESTYLE VARIABLES: HOW OFTEN DO YOU HAVE A DRINK CONTAINING ALCOHOL: 0

## 2020-02-25 NOTE — PATIENT INSTRUCTIONS
Personalized Preventive Plan for Syeda Renee - 2/25/2020  Medicare offers a range of preventive health benefits. Some of the tests and screenings are paid in full while other may be subject to a deductible, co-insurance, and/or copay. Some of these benefits include a comprehensive review of your medical history including lifestyle, illnesses that may run in your family, and various assessments and screenings as appropriate. After reviewing your medical record and screening and assessments performed today your provider may have ordered immunizations, labs, imaging, and/or referrals for you. A list of these orders (if applicable) as well as your Preventive Care list are included within your After Visit Summary for your review. Other Preventive Recommendations:    · A preventive eye exam performed by an eye specialist is recommended every 1-2 years to screen for glaucoma; cataracts, macular degeneration, and other eye disorders. · A preventive dental visit is recommended every 6 months. · Try to get at least 150 minutes of exercise per week or 10,000 steps per day on a pedometer . · Order or download the FREE \"Exercise & Physical Activity: Your Everyday Guide\" from [employer]. Call 5-325.459.7329 or search [employer] online. · You need 4136-7021 mg of calcium and 6291-0850 IU of vitamin D per day. It is possible to meet your calcium requirement with diet alone, but a vitamin D supplement is usually necessary to meet this goal.  · When exposed to the sun, use a sunscreen that protects against both UVA and UVB radiation with an SPF of 30 or greater. Reapply every 2 to 3 hours or after sweating, drying off with a towel, or swimming. · Always wear a seat belt when traveling in a car. Always wear a helmet when riding a bicycle or motorcycle.

## 2020-02-25 NOTE — PROGRESS NOTES
Chief Complaint   Patient presents with    Hypertension     6 month f/u     Medication Refill        HPI: Rick Rodriguez 75 y.o. female presenting for     Diabetes type 2  Uncontrolled type 2 diabetes. Follows with endocrinology. Patient does not check her sugars due to fear of needles. Per endocrinology notes patient takes Januvia, Actos, Prandin, and glimepiride. Admits to some hypoglycemic episodes which improved with eating or drinking sugary drinks. Is active in her garden. Recently had her feet checked by her endocrinologist.  Is scheduled to have her eyes checked (no history of diabetic neuropathy or retinopathy). And patient also has a appointment with her dentist.  Lab Results   Component Value Date    LABA1C 8.5 (H) 11/08/2019     No results found for: EAG    HTN   Patient take the hCTZ every other day (due to diurectic effect), cozaar 100 mg daily,  Amlodipine 5 mg in the morning and 2.5 mg in the evening. Denies any fevers, chills, nausea, vomiting, chest pain, shortness of breath, abdominal pain, change in urination, or change in stools. BP Readings from Last 20 Encounters:   02/25/20 136/60   11/19/19 (!) 159/81   08/26/19 124/70   05/14/19 (!) 145/67   02/25/19 (!) 169/72   11/12/18 (!) 160/77   08/24/18 (!) 158/62   05/14/18 (!) 164/76   04/17/18 (!) 170/68   11/14/17 (!) 164/81   10/17/17 132/68   05/15/17 (!) 170/70   04/18/17 132/70   11/14/16 (!) 158/68   10/25/16 166/70   05/12/16 148/72   11/12/15 148/66   05/28/15 152/74   01/22/15 168/70   10/22/14 148/74   ]    HLD   Patient is taking the pravachol daily. Patient was on cholesterol medication in the past but was discontinued due to myopathy. Patient now takes the medication M W F. This helps to decrease her myopathy.   Lab Results   Component Value Date    CHOL 191 05/04/2018    CHOL 213 (H) 11/03/2017    CHOL 218 (H) 05/05/2017     Lab Results   Component Value Date    TRIG 106 05/04/2018    TRIG 84 11/03/2017    TRIG 100 ROS  CONSTITUTIONAL: The patient denies fevers, chills, sweats and body ache. HEENT: Denies headache, blurry vision, eye pain, tinnitus, vertigo,  sore throat, neck or thyroid masses. RESPIRATORY: Denies cough, sputum, hemoptysis. CARDIAC: Denies chest pain, pressure, palpitations, Denies lower extremity edema. GASTROINTESTINAL: Denies abdominal pain, constipation, diarrhea, bleeding in the stools,   GENITOURINARY: Denies dysuria, hematuria, nocturia or frequency, urinary incontinence. NEUROLOGIC: Denies headaches, dizziness, syncope, weakness  MUSCULOSKELETAL: denies changes in range of motion, joint pain, stiffness. ENDOCRINOLOGY: Denies heat or cold intolerance. HEMATOLOGY: Denies easy bleeding or blood transfusion,anemia  DERMATOLOGY: Denies changes in moles or pigmentation changes. PSYCHIATRY: Denies depression, agitation or anxiety.     Past Medical History:   Diagnosis Date    Anemia     Fatigue     Fibroids     Hyperlipidemia     Hyperthyroidism     Palpitations     Sinusitis     Type II or unspecified type diabetes mellitus without mention of complication, not stated as uncontrolled         Past Surgical History:   Procedure Laterality Date    CYST REMOVAL Right 1966    HYSTERECTOMY  1996    TONSILLECTOMY  1949        Family History   Problem Relation Age of Onset    Cancer Other     Diabetes Other         Social History     Socioeconomic History    Marital status:      Spouse name: Not on file    Number of children: Not on file    Years of education: Not on file    Highest education level: Not on file   Occupational History    Not on file   Social Needs    Financial resource strain: Not on file    Food insecurity:     Worry: Not on file     Inability: Not on file    Transportation needs:     Medical: Not on file     Non-medical: Not on file   Tobacco Use    Smoking status: Never Smoker    Smokeless tobacco: Never Used   Substance and Sexual Activity    Alcohol use: Not on file    Drug use: Not on file    Sexual activity: Not on file   Lifestyle    Physical activity:     Days per week: Not on file     Minutes per session: Not on file    Stress: Not on file   Relationships    Social connections:     Talks on phone: Not on file     Gets together: Not on file     Attends [employer] service: Not on file     Active member of club or organization: Not on file     Attends meetings of clubs or organizations: Not on file     Relationship status: Not on file    Intimate partner violence:     Fear of current or ex partner: Not on file     Emotionally abused: Not on file     Physically abused: Not on file     Forced sexual activity: Not on file   Other Topics Concern    Not on file   Social History Narrative    Not on file        /60 (Site: Left Upper Arm, Position: Sitting, Cuff Size: Medium Adult)   Pulse 77   Temp 98.3 °F (36.8 °C) (Oral)   Ht 5' (1.524 m)   Wt 128 lb (58.1 kg)   LMP  (LMP Unknown)   SpO2 99%   BMI 25.00 kg/m²        Physical Exam:    General appearance - alert, well appearing, and in no distress  Mental Status - alert, oriented to person, place, and time  Eyes - pupils equal and reactive, extraocular eye movements intact   Ears - bilateral TM's and external ear canals normal   Nose - normal and patent, no erythema, discharge or polyps   Sinuses - Normal paranasal sinuses without tenderness   Throat - mucous membranes moist, pharynx normal without lesions   Neck - supple, no significant adenopathy   Thyroid - thyroid is normal in size without nodules or tenderness    Chest - clear to auscultation, no wheezes, rales or rhonchi, symmetric air entry   Heart - normal rate, regular rhythm, normal S1, S2, no murmurs, rubs, clicks or gallops  Abdomen - soft, nontender, nondistended, no masses or organomegaly   Back exam - full range of motion, no tenderness, palpable spasm or pain on motion   Neurological - alert, oriented, normal speech, no focal findings or movement disorder noted   Musculoskeletal - no joint tenderness, deformity or swelling   Extremities - peripheral pulses normal, no pedal edema, no clubbing or cyanosis   Skin - normal coloration and turgor, no rashes, no suspicious skin lesions noted    Labs   No results found for: TSHREFLEX  TSH   Date Value Ref Range Status   11/02/2018 3.900 0.270 - 4.200 uIU/mL Final   05/04/2018 4.850 (H) 0.270 - 4.200 uIU/mL Final   11/03/2017 4.890 (H) 0.270 - 4.200 uIU/mL Final   05/05/2017 4.450 (H) 0.270 - 4.200 uIU/mL Final   09/26/2013 3.265 0.550 - 4.780 uIU/mL Final     Lab Results   Component Value Date     11/08/2019    K 4.0 11/08/2019     11/08/2019    CO2 28 11/08/2019    BUN 18 11/08/2019    CREATININE 0.84 11/08/2019    GLUCOSE 189 (H) 11/08/2019    CALCIUM 9.0 11/08/2019    PROT 6.8 08/24/2018    LABALBU 4.3 08/24/2018    BILITOT 0.3 08/24/2018    ALKPHOS 55 08/24/2018    AST 21 08/24/2018    ALT 21 08/24/2018    LABGLOM >60.0 11/08/2019    GFRAA >60.0 11/08/2019    GLOB 2.5 08/24/2018       Lab Results   Component Value Date    WBC 4.3 (L) 05/04/2018    HGB 13.1 05/04/2018    HCT 39.2 05/04/2018    MCV 94.1 05/04/2018     05/04/2018     Lab Results   Component Value Date    LABA1C 8.5 (H) 11/08/2019     No results found for: EAG      A/P: Jorge Mayra 75 y.o. female presenting for     1. Uncontrolled type 2 diabetes mellitus without complication, without long-term current use of insulin ([facility] [address]  Follows by endocrinology   - Lipid Panel; Future    2. Encounter for screening mammogram for malignant neoplasm of breast     - GAURI DIGITAL SCREEN W CAD BILATERAL; Future    3. Essential hypertension  Stable   - losartan (COZAAR) 100 MG tablet; Take 1 tablet by mouth daily  Dispense: 90 tablet; Refill: 1  - amLODIPine (NORVASC) 2.5 MG tablet; Take 2 tablets in the morning and 1 tablet in the evening. Dispense: 270 tablet; Refill: 1    4.  Mixed hyperlipidemia    - pravastatin (PRAVACHOL) 40 MG tablet; Take 1 tablet by mouth three times a week Take 1 tab mon , wed, friday  Dispense: 36 tablet; Refill: 1  - Lipid Panel; Future    5. Macular degeneration, unspecified laterality, unspecified type  Will see optho to see about revision for cataracts.

## 2020-03-03 ENCOUNTER — HOSPITAL ENCOUNTER (OUTPATIENT)
Dept: WOMENS IMAGING | Age: 75
Discharge: HOME | End: 2020-03-05
Payer: MEDICARE

## 2020-03-03 PROCEDURE — 77067 SCR MAMMO BI INCL CAD: CPT

## 2020-05-08 DIAGNOSIS — E78.2 MIXED HYPERLIPIDEMIA: ICD-10-CM

## 2020-05-08 LAB
ANION GAP SERPL CALCULATED.3IONS-SCNC: 16 MEQ/L (ref 9–15)
BUN BLDV-MCNC: 14 MG/DL (ref 8–23)
CALCIUM SERPL-MCNC: 9.4 MG/DL (ref 8.5–9.9)
CHLORIDE BLD-SCNC: 106 MEQ/L (ref 95–107)
CHOLESTEROL, TOTAL: 194 MG/DL (ref 0–199)
CO2: 26 MEQ/L (ref 20–31)
CREAT SERPL-MCNC: 0.88 MG/DL (ref 0.5–0.9)
CREATININE URINE: 93.1 MG/DL
GFR AFRICAN AMERICAN: >60
GFR NON-AFRICAN AMERICAN: >60
GLUCOSE BLD-MCNC: 182 MG/DL (ref 70–99)
HBA1C MFR BLD: 8 % (ref 4.8–5.9)
HDLC SERPL-MCNC: 79 MG/DL (ref 40–59)
LDL CHOLESTEROL CALCULATED: 104 MG/DL (ref 0–129)
MICROALBUMIN UR-MCNC: 2.1 MG/DL
MICROALBUMIN/CREAT UR-RTO: 22.6 MG/G (ref 0–30)
POTASSIUM SERPL-SCNC: 3.4 MEQ/L (ref 3.4–4.9)
SODIUM BLD-SCNC: 148 MEQ/L (ref 135–144)
TRIGL SERPL-MCNC: 56 MG/DL (ref 0–150)

## 2020-05-19 ENCOUNTER — OFFICE VISIT (OUTPATIENT)
Dept: ENDOCRINOLOGY | Age: 75
End: 2020-05-19
Payer: MEDICARE

## 2020-05-19 VITALS
DIASTOLIC BLOOD PRESSURE: 71 MMHG | SYSTOLIC BLOOD PRESSURE: 152 MMHG | WEIGHT: 128 LBS | HEART RATE: 95 BPM | HEIGHT: 60 IN | BODY MASS INDEX: 25.13 KG/M2

## 2020-05-19 PROCEDURE — 99213 OFFICE O/P EST LOW 20 MIN: CPT | Performed by: INTERNAL MEDICINE

## 2020-05-19 PROCEDURE — 3052F HG A1C>EQUAL 8.0%<EQUAL 9.0%: CPT | Performed by: INTERNAL MEDICINE

## 2020-06-24 RX ORDER — LOSARTAN POTASSIUM 100 MG/1
100 TABLET ORAL DAILY
Qty: 90 TABLET | Refills: 1 | Status: SHIPPED | OUTPATIENT
Start: 2020-06-24 | End: 2020-09-28

## 2020-08-25 ENCOUNTER — OFFICE VISIT (OUTPATIENT)
Dept: FAMILY MEDICINE CLINIC | Age: 75
End: 2020-08-25
Payer: MEDICARE

## 2020-08-25 VITALS
HEIGHT: 60 IN | OXYGEN SATURATION: 98 % | BODY MASS INDEX: 24.15 KG/M2 | RESPIRATION RATE: 14 BRPM | DIASTOLIC BLOOD PRESSURE: 60 MMHG | SYSTOLIC BLOOD PRESSURE: 120 MMHG | WEIGHT: 123 LBS | HEART RATE: 69 BPM | TEMPERATURE: 98 F

## 2020-08-25 PROCEDURE — 99214 OFFICE O/P EST MOD 30 MIN: CPT | Performed by: FAMILY MEDICINE

## 2020-08-25 PROCEDURE — 3052F HG A1C>EQUAL 8.0%<EQUAL 9.0%: CPT | Performed by: FAMILY MEDICINE

## 2020-08-25 RX ORDER — AMLODIPINE BESYLATE 2.5 MG/1
TABLET ORAL
Qty: 270 TABLET | Refills: 1 | Status: SHIPPED | OUTPATIENT
Start: 2020-08-25 | End: 2021-02-25 | Stop reason: SDUPTHER

## 2020-08-25 RX ORDER — HYDROCHLOROTHIAZIDE 25 MG/1
25 TABLET ORAL DAILY
Qty: 90 TABLET | Refills: 3 | Status: SHIPPED | OUTPATIENT
Start: 2020-08-25 | End: 2022-08-25 | Stop reason: SDUPTHER

## 2020-08-25 ASSESSMENT — PATIENT HEALTH QUESTIONNAIRE - PHQ9
SUM OF ALL RESPONSES TO PHQ QUESTIONS 1-9: 0
SUM OF ALL RESPONSES TO PHQ9 QUESTIONS 1 & 2: 0
SUM OF ALL RESPONSES TO PHQ QUESTIONS 1-9: 0
2. FEELING DOWN, DEPRESSED OR HOPELESS: 0
1. LITTLE INTEREST OR PLEASURE IN DOING THINGS: 0

## 2020-08-25 NOTE — PROGRESS NOTES
Chief Complaint   Patient presents with    Diabetes    Hypertension        HPI: Harpreet Michele 67 y.o. female presenting for     Diabetes type 2  Patient follows endocrinology. Last A1c was 8. Patient is not checking her sugars. Per endocrinology notes patient takes Januvia, Actos, Prandin, and glimepiride. Admits to some hypoglycemic episodes which improved with eating or drinking sugary drinks. Is active in her garden. Recently had her feet checked by her endocrinologist.  Is scheduled to have her eyes checked (no history of diabetic neuropathy or retinopathy). And patient also has a appointment with her dentist.  Lab Results   Component Value Date    LABA1C 8.0 (H) 05/08/2020     No results found for: EAG    HTN   Stable at this time. Patient take the hCTZ every other day (due to diurectic effect), cozaar 100 mg daily,  Amlodipine 5 mg in the morning and 2.5 mg in the evening. Denies any fevers, chills, nausea, vomiting, chest pain, shortness of breath, abdominal pain, change in urination, or change in stools. BP Readings from Last 20 Encounters:   08/25/20 120/60   05/19/20 (!) 152/71   02/25/20 136/60   11/19/19 (!) 159/81   08/26/19 124/70   05/14/19 (!) 145/67   02/25/19 (!) 169/72   11/12/18 (!) 160/77   08/24/18 (!) 158/62   05/14/18 (!) 164/76   04/17/18 (!) 170/68   11/14/17 (!) 164/81   10/17/17 132/68   05/15/17 (!) 170/70   04/18/17 132/70   11/14/16 (!) 158/68   10/25/16 166/70   05/12/16 148/72   11/12/15 148/66   05/28/15 152/74   ]    Cataracts  Patient has cataracts and needs to have her cataracts done on the right side. Patient is planning to get the surgery done at [facility] clinic. No issues or concerns at this time. Patient denies any fevers, chills, nausea, vomiting, chest pain, shortness of breath, abdominal pain, change in nation, change in stools. Patient is going to get her catataract done on the right side done. Patient will be getting it done at the [address] [place] [address]. HLD   Patient is taking the pravachol daily. Patient was on cholesterol medication in the past but was discontinued due to myopathy. Patient now takes the medication M W F. This helps to decrease her myopathy. Lab Results   Component Value Date    CHOL 194 05/08/2020    CHOL 191 05/04/2018    CHOL 213 (H) 11/03/2017     Lab Results   Component Value Date    TRIG 56 05/08/2020    TRIG 106 05/04/2018    TRIG 84 11/03/2017     Lab Results   Component Value Date    HDL 79 (H) 05/08/2020    HDL 69 (H) 05/04/2018    HDL 71 (H) 11/03/2017     Lab Results   Component Value Date    LDLCALC 104 05/08/2020    LDLCALC 101 05/04/2018    LDLCALC 125 11/03/2017     No results found for: LABVLDL, VLDL  Lab Results   Component Value Date    CHOLHDLRATIO 4.0 11/02/2012    CHOLHDLRATIO 4.3 06/08/2012    CHOLHDLRATIO 3.7 12/16/2011         Current Outpatient Medications   Medication Sig Dispense Refill    hydroCHLOROthiazide (HYDRODIURIL) 25 MG tablet Take 1 tablet by mouth daily As needed 90 tablet 3    amLODIPine (NORVASC) 2.5 MG tablet Take 2 tablets in the morning and 1 tablet in the evening. 270 tablet 1    cyanocobalamin ([employer] VITAMIN B12) 1000 MCG tablet Take by mouth      BIOTIN PO Take by mouth      SITagliptin (JANUVIA) 100 MG tablet TAKE 1 TABLET DAILY 90 tablet 1    losartan (COZAAR) 100 MG tablet Take 1 tablet by mouth daily 90 tablet 1    pravastatin (PRAVACHOL) 40 MG tablet Take 1 tablet by mouth three times a week Take 1 tab mon , wed, friday 36 tablet 1    repaglinide (PRANDIN) 2 MG tablet TAKE 1 TABLET THREE TIMES A  tablet 4    Compression Stockings MISC 10-20 mm Hg compression      glimepiride (AMARYL) 2 MG tablet Take 1 tablet by mouth three times daily with meals.  180 tablet 3    blood glucose test strips (ONE TOUCH TEST STRIPS) strip Pt test 1x qd dx E11.65 100 each 3    Alcohol Swabs (ALCOHOL PREP) 70 % PADS bid 100 each 06    Ascorbic Acid (VITAMIN C) 500 MG tablet Take 500 mg by mouth 2 times daily.  vitamin D (CHOLECALCIFEROL) 1000 UNIT TABS tablet Take 1,000 Units by mouth daily.  ONETOUCH DELICA LANCETS MISC Test bid 100 each 11    Cyanocobalamin (VITAMIN B 12 PO) Take  by mouth.  vitamin E 100 UNITS capsule Take 800 Units by mouth daily.  Multiple Vitamin (DAILY VITAMIN PO) Take  by mouth.  fish oil-omega-3 fatty acids 1000 MG capsule Take 1 g by mouth daily. No current facility-administered medications for this visit. ROS  CONSTITUTIONAL: The patient denies fevers, chills, sweats and body ache. HEENT: Denies headache, blurry vision, eye pain, tinnitus, vertigo,  sore throat, neck or thyroid masses. RESPIRATORY: Denies cough, sputum, hemoptysis. CARDIAC: Denies chest pain, pressure, palpitations, Denies lower extremity edema. GASTROINTESTINAL: Denies abdominal pain, constipation, diarrhea, bleeding in the stools,   GENITOURINARY: Denies dysuria, hematuria, nocturia or frequency, urinary incontinence. NEUROLOGIC: Denies headaches, dizziness, syncope, weakness  MUSCULOSKELETAL: denies changes in range of motion, joint pain, stiffness. ENDOCRINOLOGY: Denies heat or cold intolerance. HEMATOLOGY: Denies easy bleeding or blood transfusion,anemia  DERMATOLOGY: Denies changes in moles or pigmentation changes. PSYCHIATRY: Denies depression, agitation or anxiety.     Past Medical History:   Diagnosis Date    Anemia     Fatigue     Fibroids     Hyperlipidemia     Hyperthyroidism     Palpitations     Sinusitis     Type II or unspecified type diabetes mellitus without mention of complication, not stated as uncontrolled         Past Surgical History:   Procedure Laterality Date    CYST REMOVAL Right 1966    HYSTERECTOMY  1996    TONSILLECTOMY  1949        Family History   Problem Relation Age of Onset    Cancer Other     Diabetes Other         Social History     Socioeconomic History    Marital status:      Spouse name: Not on file    Number of children: Not on file    Years of education: Not on file    Highest education level: Not on file   Occupational History    Not on file   Social Needs    Financial resource strain: Not on file    Food insecurity     Worry: Not on file     Inability: Not on file    Transportation needs     Medical: Not on file     Non-medical: Not on file   Tobacco Use    Smoking status: Never Smoker    Smokeless tobacco: Never Used   Substance and Sexual Activity    Alcohol use: Not on file    Drug use: Not on file    Sexual activity: Not on file   Lifestyle    Physical activity     Days per week: Not on file     Minutes per session: Not on file    Stress: Not on file   Relationships    Social connections     Talks on phone: Not on file     Gets together: Not on file     Attends Restorationist service: Not on file     Active member of club or organization: Not on file     Attends meetings of clubs or organizations: Not on file     Relationship status: Not on file    Intimate partner violence     Fear of current or ex partner: Not on file     Emotionally abused: Not on file     Physically abused: Not on file     Forced sexual activity: Not on file   Other Topics Concern    Not on file   Social History Narrative    Not on file        /60   Pulse 69   Temp 98 °F (36.7 °C)   Resp 14   Ht 5' (1.524 m)   Wt 123 lb (55.8 kg)   LMP  (LMP Unknown)   SpO2 98%   BMI 24.02 kg/m²        Physical Exam:    General appearance - alert, well appearing, and in no distress  Mental Status - alert, oriented to person, place, and time  Eyes - pupils equal and reactive, extraocular eye movements intact   Ears - bilateral TM's and external ear canals normal   Nose - normal and patent, no erythema, discharge or polyps   Sinuses - Normal paranasal sinuses without tenderness   Throat - mucous membranes moist, pharynx normal without lesions   Neck - supple, no significant adenopathy   Thyroid - thyroid is normal in size without nodules or tenderness    Chest - clear to auscultation, no wheezes, rales or rhonchi, symmetric air entry   Heart - normal rate, regular rhythm, normal S1, S2, no murmurs, rubs, clicks or gallops  Abdomen - soft, nontender, nondistended, no masses or organomegaly   Back exam - full range of motion, no tenderness, palpable spasm or pain on motion   Neurological - alert, oriented, normal speech, no focal findings or movement disorder noted   Musculoskeletal - no joint tenderness, deformity or swelling   Extremities - peripheral pulses normal, no pedal edema, no clubbing or cyanosis   Skin - normal coloration and turgor, no rashes, no suspicious skin lesions noted    Labs   No results found for: TSHREFLEX  TSH   Date Value Ref Range Status   11/02/2018 3.900 0.270 - 4.200 uIU/mL Final   05/04/2018 4.850 (H) 0.270 - 4.200 uIU/mL Final   11/03/2017 4.890 (H) 0.270 - 4.200 uIU/mL Final   05/05/2017 4.450 (H) 0.270 - 4.200 uIU/mL Final   09/26/2013 3.265 0.550 - 4.780 uIU/mL Final     Lab Results   Component Value Date     (H) 05/08/2020    K 3.4 05/08/2020     05/08/2020    CO2 26 05/08/2020    BUN 14 05/08/2020    CREATININE 0.88 05/08/2020    GLUCOSE 182 (H) 05/08/2020    CALCIUM 9.4 05/08/2020    PROT 6.8 08/24/2018    LABALBU 4.3 08/24/2018    BILITOT 0.3 08/24/2018    ALKPHOS 55 08/24/2018    AST 21 08/24/2018    ALT 21 08/24/2018    LABGLOM >60.0 05/08/2020    GFRAA >60.0 05/08/2020    GLOB 2.5 08/24/2018       Lab Results   Component Value Date    WBC 4.3 (L) 05/04/2018    HGB 13.1 05/04/2018    HCT 39.2 05/04/2018    MCV 94.1 05/04/2018     05/04/2018     Lab Results   Component Value Date    LABA1C 8.0 (H) 05/08/2020     No results found for: EAG      A/P: April Bradford 67 y.o. female presenting for     1. Essential hypertension  Stable at this time. No issues or concern. - hydroCHLOROthiazide (HYDRODIURIL) 25 MG tablet;  Take 1 tablet by mouth daily As needed  Dispense: 90

## 2020-09-28 RX ORDER — LOSARTAN POTASSIUM 100 MG/1
100 TABLET ORAL DAILY
Qty: 90 TABLET | Refills: 1 | Status: SHIPPED | OUTPATIENT
Start: 2020-09-28 | End: 2021-02-25 | Stop reason: SDUPTHER

## 2020-10-23 RX ORDER — GLIMEPIRIDE 2 MG/1
TABLET ORAL
Qty: 180 TABLET | Refills: 3 | Status: SHIPPED | OUTPATIENT
Start: 2020-10-23 | End: 2021-06-21

## 2020-10-23 NOTE — TELEPHONE ENCOUNTER
Patient is requesting medication refill.  Please approve or deny this request.    Rx requested:  Requested Prescriptions      No prescriptions requested or ordered in this encounter         Last Office Visit:   5/19/2020      Next Visit Date:  Future Appointments   Date Time Provider Jacques Burgos   11/17/2020  1:30 PM Alessandra Dowling MD [facility]   2/25/2021  9:30 AM Keyshawn Rangel MD [address] [place] [address] 7

## 2020-11-06 LAB
ANION GAP SERPL CALCULATED.3IONS-SCNC: 14 MEQ/L (ref 9–15)
BUN BLDV-MCNC: 12 MG/DL (ref 8–23)
CALCIUM SERPL-MCNC: 9.4 MG/DL (ref 8.5–9.9)
CHLORIDE BLD-SCNC: 103 MEQ/L (ref 95–107)
CO2: 25 MEQ/L (ref 20–31)
CREAT SERPL-MCNC: 0.72 MG/DL (ref 0.5–0.9)
GFR AFRICAN AMERICAN: >60
GFR NON-AFRICAN AMERICAN: >60
GLUCOSE BLD-MCNC: 191 MG/DL (ref 70–99)
HBA1C MFR BLD: 8.5 % (ref 4.8–5.9)
POTASSIUM SERPL-SCNC: 3.5 MEQ/L (ref 3.4–4.9)
SODIUM BLD-SCNC: 142 MEQ/L (ref 135–144)

## 2020-11-17 ENCOUNTER — OFFICE VISIT (OUTPATIENT)
Dept: ENDOCRINOLOGY | Age: 75
End: 2020-11-17
Payer: MEDICARE

## 2020-11-17 VITALS
OXYGEN SATURATION: 98 % | SYSTOLIC BLOOD PRESSURE: 150 MMHG | DIASTOLIC BLOOD PRESSURE: 80 MMHG | HEIGHT: 60 IN | HEART RATE: 79 BPM | WEIGHT: 125 LBS | BODY MASS INDEX: 24.54 KG/M2

## 2020-11-17 PROCEDURE — 3052F HG A1C>EQUAL 8.0%<EQUAL 9.0%: CPT | Performed by: INTERNAL MEDICINE

## 2020-11-17 PROCEDURE — 99213 OFFICE O/P EST LOW 20 MIN: CPT | Performed by: INTERNAL MEDICINE

## 2020-11-17 RX ORDER — REPAGLINIDE 2 MG/1
TABLET ORAL
Qty: 270 TABLET | Refills: 4 | Status: SHIPPED | OUTPATIENT
Start: 2020-11-17 | End: 2021-12-06 | Stop reason: SDUPTHER

## 2020-11-17 NOTE — PROGRESS NOTES
Subjective:      Patient ID: Nahomy Aguilar is a 67 y.o. female. Follow-up on type 2 diabetes complications include retinopathy and macular edema patient seen ophthalmologist at the [facility]  Recently had cataract surgery done  Patient does not test her blood sugars  Diabetes   She presents for her follow-up diabetic visit. She has type 2 diabetes mellitus. Symptoms are stable. Diabetic complications include retinopathy. Current diabetic treatment includes oral agent (triple therapy) (Pending Januvia and glimepiride). Her overall blood glucose range is >200 mg/dl. 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), bilateral (primary encounter diagnosis)  Comment: complex both eyes with FA demonstrating inferonasal BRVO both eyes with macular edema both eyes   - also s/p Cataract extraction recently with some mild late [place] [place] 73 hyperfluorescence  - also with history of Diabetes Mellitus   Imaging 10/20/2020  OCTA - Right eye: MAs without flow voids;  Left eye: normal  UWFAF: Temporal macular hypoFAF - Focal laser right eye only; left eye normal  UWFA:   - right eye: MAs and vascular remodeling in the inferotemporal periphery; late petalloid macular leakage with staining in temporal macula (focal scars); mild late ONH hyperfluorescence  - left eye: MAs and vascular remodeling in the inferotemporal periphery; late macular leakage worst temporally; mild late ONH hyperfluorescence    H34.8330 Tributary (branch) retinal vein occlusion, bilateral, with macular edema  Comment:   - History of injections and laser about 5 years ago (2015) with Dr Walt Zamudio right eye   - no history of any injections or laser in left eye     RIGHT EYE:  - s/p injections in ~2015 and s/p focal in ~2015 with Dr Tio Huffman as above  - now with intraretinal fluid on OCT   - recommend starting anti-VEGF therapy  - she is still using ketorolac and prednisolone acetate 1% in right eye twice a day - OK to continue with this and the taper schedule from Dr Ying Peralta  - Risks (including infection, bleeding, loss of vision, retinal detachment) and benefits/alternatives discussed. - Signs and symptoms of endophthalmitis reviewed with pt. 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  Range 5/8/2020 07:42 5/8/2020 07:43 11/6/2020 07:27   Sodium Latest Ref Range: 135 - 144 mEq/L 148 (H)  142   Potassium Latest Ref Range: 3.4 - 4.9 mEq/L 3.4  3.5   Chloride Latest Ref Range: 95 - 107 mEq/L 106  103   CO2 Latest Ref Range: 20 - 31 mEq/L 26  25   BUN Latest Ref Range: 8 - 23 mg/dL 14  12   Creatinine Latest Ref Range: 0.50 - 0.90 mg/dL 0.88  0.72   Anion Gap Latest Ref Range: 9 - 15 mEq/L 16 (H)  14   GFR Non- Latest Ref Range: >60  >60.0  >60.0   GFR African American Latest Ref Range: >60  >60.0  >60.0   Glucose Latest Ref Range: 70 - 99 mg/dL 182 (H)  191 (H)   Calcium Latest Ref Range: 8.5 - 9.9 mg/dL 9.4  9.4 Hemoglobin A1C Latest Ref Range: 4.8 - 5.9 % 8.0 (H)  8.5 (H)     Patient Active Problem List   Diagnosis    Mixed hyperlipidemia    Essential hypertension    Vitamin D deficiency    Anemia    Fibroids    Macular degeneration    Stenosis of right carotid artery    Uncontrolled type 2 diabetes mellitus without complication, without long-term current use of insulin     Allergies   Allergen Reactions    Adhesive Tape      Other reaction(s): Other: See Comments  Peels skin off     Avapro [Irbesartan]     Eggs [Egg White]     Lipitor     Morphine     Pcn [Penicillins]     Statins      High dose of anything--->achy  Other reaction(s): Myalgia  High dose of anything--->achy       Current Outpatient Medications:     repaglinide (PRANDIN) 2 MG tablet, TAKE 1 TABLET THREE TIMES A DAY, Disp: 270 tablet, Rfl: 4    glimepiride (AMARYL) 2 MG tablet, Take 1 tablet by mouth three times daily with meals. , Disp: 180 tablet, Rfl: 3    losartan (COZAAR) 100 MG tablet, Take 1 tablet by mouth daily, Disp: 90 tablet, Rfl: 0    losartan (COZAAR) 100 MG tablet, Take 1 tablet by mouth daily, Disp: 90 tablet, Rfl: 1    cyanocobalamin ([employer]S VITAMIN B12) 1000 MCG tablet, Take by mouth, Disp: , Rfl:     BIOTIN PO, Take by mouth, Disp: , Rfl:     hydroCHLOROthiazide (HYDRODIURIL) 25 MG tablet, Take 1 tablet by mouth daily As needed, Disp: 90 tablet, Rfl: 3    amLODIPine (NORVASC) 2.5 MG tablet, Take 2 tablets in the morning and 1 tablet in the evening., Disp: 270 tablet, Rfl: 1    SITagliptin (JANUVIA) 100 MG tablet, TAKE 1 TABLET DAILY, Disp: 90 tablet, Rfl: 1    pravastatin (PRAVACHOL) 40 MG tablet, Take 1 tablet by mouth three times a week Take 1 tab mon , wed, friday, Disp: 36 tablet, Rfl: 1    Compression Stockings MISC, 10-20 mm Hg compression, Disp: , Rfl:     blood glucose test strips (ONE TOUCH TEST STRIPS) strip, Pt test 1x qd dx E11.65, Disp: 100 each, Rfl: 3    Alcohol Swabs (ALCOHOL PREP) 70 % PADS, bid, Disp: 100 each, Rfl: 06    Ascorbic Acid (VITAMIN C) 500 MG tablet, Take 500 mg by mouth 2 times daily. , Disp: , Rfl:     vitamin D (CHOLECALCIFEROL) 1000 UNIT TABS tablet, Take 1,000 Units by mouth daily. , Disp: , Rfl:     ONETOUCH DELICA LANCETS MISC, Test bid, Disp: 100 each, Rfl: 11    Cyanocobalamin (VITAMIN B 12 PO), Take  by mouth.  , Disp: , Rfl:     vitamin E 100 UNITS capsule, Take 800 Units by mouth daily. , Disp: , Rfl:     Multiple Vitamin (DAILY VITAMIN PO), Take  by mouth.  , Disp: , Rfl:     fish oil-omega-3 fatty acids 1000 MG capsule, Take 1 g by mouth daily. , Disp: , Rfl:       Review of Systems   Eyes: Positive for visual disturbance. All other systems reviewed and are negative. Vitals:    11/17/20 1339 11/17/20 1340   BP: (!) 157/85 (!) 150/80   Pulse: 79    SpO2: 98%    Weight: 125 lb (56.7 kg)    Height: 5' (1.524 m)        Objective:   Physical Exam  Vitals signs reviewed. Constitutional:       Appearance: Normal appearance. HENT:      Head: Normocephalic and atraumatic. Right Ear: External ear normal.      Left Ear: External ear normal.      Nose: Nose normal.   Neck:      Musculoskeletal: Normal range of motion and neck supple. Cardiovascular:      Rate and Rhythm: Normal rate and regular rhythm. Musculoskeletal: Normal range of motion. Neurological:      General: No focal deficit present. Mental Status: She is alert and oriented to person, place, and time. Psychiatric:         Mood and Affect: Mood normal.         Behavior: Behavior normal.         Assessment:       Diagnosis Orders   1.  Uncontrolled type [facility] diabetes mellitus with [facility]             Plan:      Orders Placed This Encounter   Procedures    Basic Metabolic Panel     Standing Status:   Future     Standing Expiration Date:   11/17/2021    Hemoglobin A1C     Standing Status:   Future     Standing Expiration Date:   11/17/2021     Orders Placed This Encounter   Medications    repaglinide (PRANDIN) 2 MG tablet     Sig: TAKE 1 TABLET THREE TIMES A DAY     Dispense:  270 tablet     Refill:  4     Continue Januvia 20 mg daily plus glimepiride 2 mg twice daily follow-up in 6 months A1c goal of 8 or lower        Iwona Jhaveri MD

## 2020-11-20 RX ORDER — PRAVASTATIN SODIUM 40 MG
40 TABLET ORAL
Qty: 36 TABLET | Refills: 1 | Status: SHIPPED | OUTPATIENT
Start: 2020-11-20 | End: 2021-04-13 | Stop reason: SDUPTHER

## 2020-11-20 NOTE — TELEPHONE ENCOUNTER
Patient requesting medication refill.  Please approve or deny this request.    Rx requested:  Requested Prescriptions     Pending Prescriptions Disp Refills    pravastatin (PRAVACHOL) 40 MG tablet 36 tablet 1     Sig: Take 1 tablet by mouth three times a week Take 1 tab mon , wed, friday         Last Office Visit:   8/25/2020      Next Visit Date:  Future Appointments   Date Time Provider Jacques Burgos   2/25/2021  9:30 AM Heaven Cali  [place], [place] 7   5/3/2021  1:15 PM Sherif Nelson MD [facility]

## 2020-12-14 NOTE — TELEPHONE ENCOUNTER
patient requesting medication refill.  Please approve or deny this request.    Rx requested:  Requested Prescriptions     Pending Prescriptions Disp Refills    SITagliptin (JANUVIA) 100 MG tablet 90 tablet 1     Sig: TAKE 1 TABLET DAILY         Last Office Visit:   11/17/2020      Next Visit Date:  Future Appointments   Date Time Provider Jacques Burgos   2/25/2021  9:30 AM Mario Alberto Rao  [place], [place]   5/3/2021  1:15 PM Camila Krishna MD [facility]

## 2021-02-25 ENCOUNTER — OFFICE VISIT (OUTPATIENT)
Dept: FAMILY MEDICINE CLINIC | Age: 76
End: 2021-02-25
Payer: MEDICARE

## 2021-02-25 VITALS
OXYGEN SATURATION: 98 % | SYSTOLIC BLOOD PRESSURE: 138 MMHG | WEIGHT: 125.4 LBS | TEMPERATURE: 98.1 F | HEIGHT: 59 IN | DIASTOLIC BLOOD PRESSURE: 70 MMHG | BODY MASS INDEX: 25.28 KG/M2 | HEART RATE: 84 BPM

## 2021-02-25 DIAGNOSIS — Z86.19 H/O COLD SORES: ICD-10-CM

## 2021-02-25 DIAGNOSIS — E11.3213 TYPE 2 DIABETES MELLITUS WITH BOTH EYES AFFECTED BY MILD NONPROLIFERATIVE RETINOPATHY AND MACULAR EDEMA, WITHOUT LONG-TERM CURRENT USE OF INSULIN (HCC): ICD-10-CM

## 2021-02-25 DIAGNOSIS — Z12.31 ENCOUNTER FOR SCREENING MAMMOGRAM FOR MALIGNANT NEOPLASM OF BREAST: ICD-10-CM

## 2021-02-25 DIAGNOSIS — I10 ESSENTIAL HYPERTENSION: Primary | ICD-10-CM

## 2021-02-25 PROCEDURE — 99214 OFFICE O/P EST MOD 30 MIN: CPT | Performed by: FAMILY MEDICINE

## 2021-02-25 RX ORDER — AMLODIPINE BESYLATE 2.5 MG/1
TABLET ORAL
Qty: 270 TABLET | Refills: 1 | Status: SHIPPED | OUTPATIENT
Start: 2021-02-25 | End: 2021-08-24 | Stop reason: SDUPTHER

## 2021-02-25 RX ORDER — ACYCLOVIR 50 MG/G
OINTMENT TOPICAL
Qty: 30 G | Refills: 1 | Status: SHIPPED | OUTPATIENT
Start: 2021-02-25

## 2021-02-25 RX ORDER — LOSARTAN POTASSIUM 100 MG/1
100 TABLET ORAL DAILY
Qty: 90 TABLET | Refills: 1 | Status: SHIPPED | OUTPATIENT
Start: 2021-02-25 | End: 2021-08-24 | Stop reason: SDUPTHER

## 2021-02-25 ASSESSMENT — PATIENT HEALTH QUESTIONNAIRE - PHQ9
SUM OF ALL RESPONSES TO PHQ QUESTIONS 1-9: 0
SUM OF ALL RESPONSES TO PHQ QUESTIONS 1-9: 0

## 2021-02-25 NOTE — PROGRESS NOTES
Chief Complaint   Patient presents with    Hypertension     Does not monitor BP at home. Denies any chest pain or abnormal heart palpitations.  Diabetes     Does not check blood sugar at home.  Hyperlipidemia        HPI: Min Barrett 67 y.o. female presenting for     COVID vaccine questions   Patient wanted to have more information about the COVID vaccine. Patient is hesitant due to having history with hives in the past and wanted to know what to do if her symptoms occur hours from when she gets the vaccine. Diabetes type 2  Patient follows endocrinology. Last A1c was 8. Patient is not checking her sugars. Per endocrinology notes patient takes Januvia, Actos, Prandin, and glimepiride. Admits to some hypoglycemic episodes which improved with eating or drinking sugary drinks. Is active in her garden. Recently had her feet checked by her endocrinologist.  Is scheduled to have her eyes checked (no history of diabetic neuropathy or retinopathy). And patient also has a appointment with her dentist.  Lab Results   Component Value Date    LABA1C 8.5 (H) 11/06/2020     No results found for: EAG      HTN   Stable at this time. Patient take the hCTZ every other day (due to diurectic effect), cozaar 100 mg daily,  Amlodipine 5 mg in the morning and 2.5 mg in the evening. Denies any fevers, chills, nausea, vomiting, chest pain, shortness of breath, abdominal pain, change in urination, or change in stools.   BP Readings from Last 20 Encounters:   02/25/21 138/70   11/17/20 (!) 150/80   08/25/20 120/60   05/19/20 (!) 152/71   02/25/20 136/60   11/19/19 (!) 159/81   08/26/19 124/70   05/14/19 (!) 145/67   02/25/19 (!) 169/72   11/12/18 (!) 160/77   08/24/18 (!) 158/62   05/14/18 (!) 164/76   04/17/18 (!) 170/68   11/14/17 (!) 164/81   10/17/17 132/68   05/15/17 (!) 170/70   04/18/17 132/70   11/14/16 (!) 158/68   10/25/16 166/70   05/12/16 148/72   ]    Cataracts  Follows up with Ophthalmology     HLD Patient is taking the pravachol daily. Patient was on cholesterol medication in the past but was discontinued due to myopathy. Patient now takes the medication M W F. This helps to decrease her myopathy. Lab Results   Component Value Date    CHOL 194 05/08/2020    CHOL 191 05/04/2018    CHOL 213 (H) 11/03/2017     Lab Results   Component Value Date    TRIG 56 05/08/2020    TRIG 106 05/04/2018    TRIG 84 11/03/2017     Lab Results   Component Value Date    HDL 79 (H) 05/08/2020    HDL 69 (H) 05/04/2018    HDL 71 (H) 11/03/2017     Lab Results   Component Value Date    LDLCALC 104 05/08/2020    LDLCALC 101 05/04/2018    LDLCALC 125 11/03/2017     No results found for: LABVLDL, VLDL  Lab Results   Component Value Date    CHOLHDLRATIO 4.0 11/02/2012    CHOLHDLRATIO 4.3 06/08/2012    CHOLHDLRATIO 3.7 12/16/2011     History of cold sores   Recurrent   Occurs on the face   Denies any outbreaks currently   Acyclovir ointment helped in the past with her lesions. Requesting a refill on the medication. Current Outpatient Medications   Medication Sig Dispense Refill    losartan (COZAAR) 100 MG tablet Take 1 tablet by mouth daily 90 tablet 1    amLODIPine (NORVASC) 2.5 MG tablet Take 2 tablets in the morning and 1 tablet in the evening. 270 tablet 1    acyclovir (ZOVIRAX) 5 % ointment Apply 5 times daily for 4 days on the affected area. Apply a small amount. 30 g 1    SITagliptin (JANUVIA) 100 MG tablet TAKE 1 TABLET DAILY 90 tablet 1    pravastatin (PRAVACHOL) 40 MG tablet Take 1 tablet by mouth three times a week Take 1 tab mon , wed, friday 36 tablet 1    repaglinide (PRANDIN) 2 MG tablet TAKE 1 TABLET THREE TIMES A  tablet 4    glimepiride (AMARYL) 2 MG tablet Take 1 tablet by mouth three times daily with meals.  180 tablet 3    cyanocobalamin ([employer] VITAMIN B12) 1000 MCG tablet Take by mouth      BIOTIN PO Take by mouth      hydroCHLOROthiazide (HYDRODIURIL) 25 MG tablet Take 1 tablet by mouth Laterality Date    CATARACT REMOVAL Left     CATARACT REMOVAL Right     CYST REMOVAL Right 1966    HYSTERECTOMY  1996    TONSILLECTOMY  1949        Family History   Problem Relation Age of Onset    Cancer Other     Diabetes Other         Social History     Socioeconomic History    Marital status:      Spouse name: Not on file    Number of children: Not on file    Years of education: Not on file    Highest education level: Not on file   Occupational History    Not on file   Social Needs    Financial resource strain: Not on file    Food insecurity     Worry: Not on file     Inability: Not on file    Transportation needs     Medical: Not on file     Non-medical: Not on file   Tobacco Use    Smoking status: Never Smoker    Smokeless tobacco: Never Used   Substance and Sexual Activity    Alcohol use: Not on file    Drug use: Not on file    Sexual activity: Not on file   Lifestyle    Physical activity     Days per week: Not on file     Minutes per session: Not on file    Stress: Not on file   Relationships    Social connections     Talks on phone: Not on file     Gets together: Not on file     Attends Worship service: Not on file     Active member of club or organization: Not on file     Attends meetings of clubs or organizations: Not on file     Relationship status: Not on file    Intimate partner violence     Fear of current or ex partner: Not on file     Emotionally abused: Not on file     Physically abused: Not on file     Forced sexual activity: Not on file   Other Topics Concern    Not on file   Social History Narrative    Not on file        /70 (Site: Right Upper Arm, Position: Sitting, Cuff Size: Medium Adult)   Pulse 84   Temp 98.1 °F (36.7 °C) (Oral)   Ht 4' 10.5\" (1.486 m)   Wt 125 lb 6.4 oz (56.9 kg)   LMP  (LMP Unknown)   SpO2 98%   BMI 25.76 kg/m²        Physical Exam:    General appearance - alert, well appearing, and in no distress  Mental Status - alert, oriented to person, place, and time  Eyes - pupils equal and reactive, extraocular eye movements intact   Ears - bilateral TM's and external ear canals normal   Nose - normal and patent, no erythema, discharge or polyps   Sinuses - Normal paranasal sinuses without tenderness   Throat - mucous membranes moist, pharynx normal without lesions   Neck - supple, no significant adenopathy   Thyroid - thyroid is normal in size without nodules or tenderness    Chest - clear to auscultation, no wheezes, rales or rhonchi, symmetric air entry   Heart - normal rate, regular rhythm, normal S1, S2, no murmurs, rubs, clicks or gallops  Abdomen - soft, nontender, nondistended, no masses or organomegaly   Back exam - full range of motion, no tenderness, palpable spasm or pain on motion   Neurological - alert, oriented, normal speech, no focal findings or movement disorder noted   Musculoskeletal - no joint tenderness, deformity or swelling   Extremities - peripheral pulses normal, no pedal edema, no clubbing or cyanosis   Skin - normal coloration and turgor, no rashes, no suspicious skin lesions noted    Labs   No results found for: TSHREFLEX  TSH   Date Value Ref Range Status   11/02/2018 3.900 0.270 - 4.200 uIU/mL Final   05/04/2018 4.850 (H) 0.270 - 4.200 uIU/mL Final   11/03/2017 4.890 (H) 0.270 - 4.200 uIU/mL Final   05/05/2017 4.450 (H) 0.270 - 4.200 uIU/mL Final   09/26/2013 3.265 0.550 - 4.780 uIU/mL Final     Lab Results   Component Value Date     11/06/2020    K 3.5 11/06/2020     11/06/2020    CO2 25 11/06/2020    BUN 12 11/06/2020    CREATININE 0.72 11/06/2020    GLUCOSE 191 (H) 11/06/2020    CALCIUM 9.4 11/06/2020    PROT 6.8 08/24/2018    LABALBU 4.3 08/24/2018    BILITOT 0.3 08/24/2018    ALKPHOS 55 08/24/2018    AST 21 08/24/2018    ALT 21 08/24/2018    LABGLOM >60.0 11/06/2020    GFRAA >60.0 11/06/2020    GLOB 2.5 08/24/2018       Lab Results   Component Value Date    WBC 4.3 (L) 05/04/2018    HGB 13.1 05/04/2018    HCT 39.2 05/04/2018    MCV 94.1 05/04/2018     05/04/2018     Lab Results   Component Value Date    LABA1C 8.5 (H) 11/06/2020     No results found for: EAG      A/P: Dayna Beltre 67 y.o. female presenting for     1. Essential hypertension  - losartaan  - amLODIPine (NORVASC) 2.5 MG tablet; Take 2 tablets in the morning and 1 tablet in the evening. Dispense: 270 tablet; Refill: 1    2. Type 2 diabetes mellitus with both eyes affected by mild nonproliferative retinopathy and macular edema, without long-term current use of insulin (HCC)  Stable. Follow up with endocrinology    3. Encounter for screening mammogram for malignant neoplasm of breast     - GAURI DIGITAL SCREEN W OR WO CAD BILATERAL; Future    4.  Cold  Sores   Can use zorivax as needed      Patient prefers all medication to go through [facility]

## 2021-02-26 ENCOUNTER — TELEPHONE (OUTPATIENT)
Dept: FAMILY MEDICINE CLINIC | Age: 76
End: 2021-02-26

## 2021-03-04 NOTE — TELEPHONE ENCOUNTER
Acyclovir was denied. Per discuss with Dr. 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 She would like this re-submitted.

## 2021-03-09 ENCOUNTER — HOSPITAL ENCOUNTER (OUTPATIENT)
Dept: WOMENS IMAGING | Age: 76
Discharge: HOME | End: 2021-03-11
Payer: MEDICARE

## 2021-03-09 DIAGNOSIS — Z12.31 ENCOUNTER FOR SCREENING MAMMOGRAM FOR MALIGNANT NEOPLASM OF BREAST: ICD-10-CM

## 2021-03-09 PROCEDURE — 77067 SCR MAMMO BI INCL CAD: CPT

## 2021-03-12 NOTE — TELEPHONE ENCOUNTER
Aliya at [facility] called and states that Zovirax 5% Cream that is used for Recurrent herpes labialis , but prior [employer] will need done. Script for the cream will needs send, that will start the prior auth process.

## 2021-04-13 DIAGNOSIS — E78.2 MIXED HYPERLIPIDEMIA: ICD-10-CM

## 2021-04-13 RX ORDER — PRAVASTATIN SODIUM 40 MG
40 TABLET ORAL
Qty: 36 TABLET | Refills: 1 | Status: SHIPPED | OUTPATIENT
Start: 2021-04-14 | End: 2021-08-24 | Stop reason: SDUPTHER

## 2021-04-13 NOTE — TELEPHONE ENCOUNTER
Patient requesting medication refill.  Please approve or deny this request.    Rx requested:  Requested Prescriptions     Pending Prescriptions Disp Refills    pravastatin (PRAVACHOL) 40 MG tablet 36 tablet 1     Sig: Take 1 tablet by mouth three times a week Take 1 tab mon , wed, friday         Last Office Visit:   2/25/2021      Next Visit Date:  Future Appointments   Date Time Provider Jacques Burgos   5/3/2021  1:15 PM Hal Kendall  [address]   8/24/2021  9:00 AM Marcelle Levy MD [id number] [facility]

## 2021-04-23 DIAGNOSIS — E11.65 UNCONTROLLED TYPE 2 DIABETES MELLITUS WITH HYPERGLYCEMIA (HCC): ICD-10-CM

## 2021-04-23 LAB
ANION GAP SERPL CALCULATED.3IONS-SCNC: 14 MEQ/L (ref 9–15)
BUN BLDV-MCNC: 13 MG/DL (ref 8–23)
CALCIUM SERPL-MCNC: 8.8 MG/DL (ref 8.5–9.9)
CHLORIDE BLD-SCNC: 102 MEQ/L (ref 95–107)
CO2: 26 MEQ/L (ref 20–31)
CREAT SERPL-MCNC: 0.76 MG/DL (ref 0.5–0.9)
GFR AFRICAN AMERICAN: >60
GFR NON-AFRICAN AMERICAN: >60
GLUCOSE BLD-MCNC: 199 MG/DL (ref 70–99)
HBA1C MFR BLD: 9 % (ref 4.8–5.9)
POTASSIUM SERPL-SCNC: 3.7 MEQ/L (ref 3.4–4.9)
SODIUM BLD-SCNC: 142 MEQ/L (ref 135–144)

## 2021-05-03 ENCOUNTER — OFFICE VISIT (OUTPATIENT)
Dept: ENDOCRINOLOGY | Age: 76
End: 2021-05-03
Payer: MEDICARE

## 2021-05-03 VITALS
SYSTOLIC BLOOD PRESSURE: 138 MMHG | HEART RATE: 74 BPM | BODY MASS INDEX: 24.35 KG/M2 | HEIGHT: 60 IN | OXYGEN SATURATION: 96 % | WEIGHT: 124 LBS | DIASTOLIC BLOOD PRESSURE: 75 MMHG

## 2021-05-03 DIAGNOSIS — E11.65 UNCONTROLLED TYPE 2 DIABETES MELLITUS WITH HYPERGLYCEMIA (HCC): Primary | ICD-10-CM

## 2021-05-03 PROCEDURE — 99213 OFFICE O/P EST LOW 20 MIN: CPT | Performed by: INTERNAL MEDICINE

## 2021-05-03 PROCEDURE — 3052F HG A1C>EQUAL 8.0%<EQUAL 9.0%: CPT | Performed by: INTERNAL MEDICINE

## 2021-05-03 NOTE — PROGRESS NOTES
5/3/2021    Assessment:       Diagnosis Orders   1. Uncontrolled type 2 diabetes mellitus with hyperglycemia ([facility] [address]           PLAN:       Orders Placed This Encounter   Procedures    Basic Metabolic Panel     Standing Status:   Future     Standing Expiration Date:   5/3/2022    Hemoglobin A1C     Standing Status:   Future     Standing Expiration Date:   5/3/2022    Microalbumin / Creatinine Urine Ratio     Standing Status:   Future     Standing Expiration Date:   5/3/2022     Continue Prandin 2 mg 3 times a day Januvia 100 mg daily and glimepiride 4 mg twice a day follow-up in 6 months time A1c goal of less than 8    Subjective:     Chief Complaint   Patient presents with    Diabetes     Vitals:    05/03/21 1316 05/03/21 1318   BP: (!) 140/73 138/75   Pulse: 74    SpO2: 96%    Weight: 124 lb (56.2 kg)    Height: 5' (1.524 m)      Wt Readings from Last 3 Encounters:   05/03/21 124 lb (56.2 kg)   02/25/21 125 lb 6.4 oz (56.9 kg)   11/17/20 125 lb (56.7 kg)     BP Readings from Last 3 Encounters:   05/03/21 138/75   02/25/21 138/70   11/17/20 (!) 150/80     Follow-up on type 2 diabetes patient's A1c has gone up to 9 average blood sugars around 220 reviewed medication list no new changes patient also has received visors COVID-19 vaccine  Patient on 3 oral agents Prandin 3 times daily Januvia 100 mg daily and glimepiride 4 mg twice daily    Diabetes  She presents for her follow-up diabetic visit. She has type 2 diabetes mellitus. There are no hypoglycemic complications. Symptoms are worsening. Current diabetic treatment includes oral agent (triple therapy). Her overall blood glucose range is >200 mg/dl.  (Lab Results       Component                Value               Date                       LABA1C                   9.0 (H)             04/23/2021            )     Past Medical History:   Diagnosis Date    Anemia     Fatigue     Fibroids     Hyperlipidemia     Hyperthyroidism     Palpitations     Sinusitis     Type II or unspecified type diabetes mellitus without mention of complication, not stated as uncontrolled      Past Surgical History:   Procedure Laterality Date    CATARACT REMOVAL Left     CATARACT REMOVAL Right     CYST REMOVAL Right 1966    HYSTERECTOMY  1996    TONSILLECTOMY  1949     Social History     Socioeconomic History    Marital status:      Spouse name: Not on file    Number of children: Not on file    Years of education: Not on file    Highest education level: Not on file   Occupational History    Not on file   Social Needs    Financial resource strain: Not on file    Food insecurity     Worry: Not on file     Inability: Not on file    Transportation needs     Medical: Not on file     Non-medical: Not on file   Tobacco Use    Smoking status: Never Smoker    Smokeless tobacco: Never Used   Substance and Sexual Activity    Alcohol use: Not on file    Drug use: Not on file    Sexual activity: Not on file   Lifestyle    Physical activity     Days per week: Not on file     Minutes per session: Not on file    Stress: Not on file   Relationships    Social connections     Talks on phone: Not on file     Gets together: Not on file     Attends Voodoo service: Not on file     Active member of club or organization: Not on file     Attends meetings of clubs or organizations: Not on file     Relationship status: Not on file    Intimate partner violence     Fear of current or ex partner: Not on file     Emotionally abused: Not on file     Physically abused: Not on file     Forced sexual activity: Not on file   Other Topics Concern    Not on file   Social History Narrative    Not on file     Family History   Problem Relation Age of Onset    Cancer Other     Diabetes Other     Breast Cancer Maternal Aunt      Allergies   Allergen Reactions    Adhesive Tape      Other reaction(s):  Other: See Comments  Peels skin off     Avapro [Irbesartan]     Eggs [Egg White]     Lipitor     Morphine     Pcn [Penicillins]     Statins      High dose of anything--->achy  Other reaction(s): Myalgia  High dose of anything--->achy       Current Outpatient Medications:     pravastatin (PRAVACHOL) 40 MG tablet, Take 1 tablet by mouth three times a week Take 1 tab mon , wed, friday, Disp: 36 tablet, Rfl: 1    losartan (COZAAR) 100 MG tablet, Take 1 tablet by mouth daily, Disp: 90 tablet, Rfl: 1    amLODIPine (NORVASC) 2.5 MG tablet, Take 2 tablets in the morning and 1 tablet in the evening., Disp: 270 tablet, Rfl: 1    acyclovir (ZOVIRAX) 5 % ointment, Apply 5 times daily for 4 days on the affected area. Apply a small amount., Disp: 30 g, Rfl: 1    SITagliptin (JANUVIA) 100 MG tablet, TAKE 1 TABLET DAILY, Disp: 90 tablet, Rfl: 1    repaglinide (PRANDIN) 2 MG tablet, TAKE 1 TABLET THREE TIMES A DAY, Disp: 270 tablet, Rfl: 4    glimepiride (AMARYL) 2 MG tablet, Take 1 tablet by mouth three times daily with meals. , Disp: 180 tablet, Rfl: 3    cyanocobalamin ([employer]S VITAMIN B12) 1000 MCG tablet, Take by mouth, Disp: , Rfl:     BIOTIN PO, Take by mouth, Disp: , Rfl:     hydroCHLOROthiazide (HYDRODIURIL) 25 MG tablet, Take 1 tablet by mouth daily As needed, Disp: 90 tablet, Rfl: 3    Compression Stockings MISC, 10-20 mm Hg compression, Disp: , Rfl:     blood glucose test strips (ONE TOUCH TEST STRIPS) strip, Pt test 1x qd dx E11.65, Disp: 100 each, Rfl: 3    Alcohol Swabs (ALCOHOL PREP) 70 % PADS, bid, Disp: 100 each, Rfl: 06    Ascorbic Acid (VITAMIN C) 500 MG tablet, Take 500 mg by mouth 2 times daily. , Disp: , Rfl:     vitamin D (CHOLECALCIFEROL) 1000 UNIT TABS tablet, Take 1,000 Units by mouth daily. , Disp: , Rfl:     ONETOUCH DELICA LANCETS MISC, Test bid, Disp: 100 each, Rfl: 11    vitamin E 100 UNITS capsule, Take 800 Units by mouth daily. , Disp: , Rfl:     Multiple Vitamin (DAILY VITAMIN PO), Take  by mouth.  , Disp: , Rfl:     fish oil-omega-3 fatty acids 1000 MG capsule, Take 1 g by mouth

## 2021-06-21 DIAGNOSIS — E11.65 UNCONTROLLED TYPE 2 DIABETES MELLITUS WITH HYPERGLYCEMIA (HCC): ICD-10-CM

## 2021-06-21 RX ORDER — GLIMEPIRIDE 2 MG/1
TABLET ORAL
Qty: 180 TABLET | Refills: 5 | Status: SHIPPED | OUTPATIENT
Start: 2021-06-21 | End: 2022-06-16 | Stop reason: SDUPTHER

## 2021-08-24 ENCOUNTER — OFFICE VISIT (OUTPATIENT)
Dept: FAMILY MEDICINE CLINIC | Age: 76
End: 2021-08-24
Payer: MEDICARE

## 2021-08-24 VITALS
WEIGHT: 120.2 LBS | TEMPERATURE: 98.6 F | OXYGEN SATURATION: 97 % | DIASTOLIC BLOOD PRESSURE: 62 MMHG | BODY MASS INDEX: 23.6 KG/M2 | SYSTOLIC BLOOD PRESSURE: 122 MMHG | HEART RATE: 80 BPM | HEIGHT: 60 IN

## 2021-08-24 DIAGNOSIS — B35.1 ONYCHOMYCOSIS: Primary | ICD-10-CM

## 2021-08-24 DIAGNOSIS — E11.3213 TYPE 2 DIABETES MELLITUS WITH BOTH EYES AFFECTED BY MILD NONPROLIFERATIVE RETINOPATHY AND MACULAR EDEMA, WITHOUT LONG-TERM CURRENT USE OF INSULIN (HCC): ICD-10-CM

## 2021-08-24 DIAGNOSIS — E78.2 MIXED HYPERLIPIDEMIA: ICD-10-CM

## 2021-08-24 DIAGNOSIS — H26.9 CATARACT OF RIGHT EYE, UNSPECIFIED CATARACT TYPE: ICD-10-CM

## 2021-08-24 DIAGNOSIS — I10 ESSENTIAL HYPERTENSION: ICD-10-CM

## 2021-08-24 PROBLEM — H25.813 COMBINED FORM OF AGE-RELATED CATARACT, BOTH EYES: Status: ACTIVE | Noted: 2020-07-28

## 2021-08-24 PROBLEM — H35.373 EPIRETINAL MEMBRANE (ERM) OF BOTH EYES: Status: ACTIVE | Noted: 2020-07-28

## 2021-08-24 PROBLEM — Z96.1 PSEUDOPHAKIA OF BOTH EYES: Status: ACTIVE | Noted: 2020-10-26

## 2021-08-24 PROBLEM — H43.813 PVD (POSTERIOR VITREOUS DETACHMENT), BILATERAL: Status: ACTIVE | Noted: 2020-07-28

## 2021-08-24 PROBLEM — D64.9 ANEMIA: Status: ACTIVE | Noted: 2020-07-27

## 2021-08-24 PROBLEM — H34.8312 BRANCH RETINAL VEIN OCCLUSION OF RIGHT EYE: Status: ACTIVE | Noted: 2021-08-24

## 2021-08-24 PROBLEM — H34.8330: Status: ACTIVE | Noted: 2020-10-26

## 2021-08-24 PROCEDURE — 99214 OFFICE O/P EST MOD 30 MIN: CPT | Performed by: FAMILY MEDICINE

## 2021-08-24 PROCEDURE — 3052F HG A1C>EQUAL 8.0%<EQUAL 9.0%: CPT | Performed by: FAMILY MEDICINE

## 2021-08-24 RX ORDER — LOSARTAN POTASSIUM 100 MG/1
100 TABLET ORAL DAILY
Qty: 90 TABLET | Refills: 1 | Status: SHIPPED | OUTPATIENT
Start: 2021-08-24 | End: 2022-02-24 | Stop reason: SDUPTHER

## 2021-08-24 RX ORDER — ASPIRIN 81 MG/1
81 TABLET ORAL DAILY
COMMUNITY

## 2021-08-24 RX ORDER — AMLODIPINE BESYLATE 2.5 MG/1
TABLET ORAL
Qty: 270 TABLET | Refills: 1 | Status: SHIPPED | OUTPATIENT
Start: 2021-08-24 | End: 2022-02-24 | Stop reason: SDUPTHER

## 2021-08-24 RX ORDER — PRAVASTATIN SODIUM 40 MG
40 TABLET ORAL
Qty: 36 TABLET | Refills: 1 | Status: SHIPPED | OUTPATIENT
Start: 2021-08-25 | End: 2022-02-24 | Stop reason: SDUPTHER

## 2021-08-24 SDOH — ECONOMIC STABILITY: FOOD INSECURITY: WITHIN THE PAST 12 MONTHS, THE FOOD YOU BOUGHT JUST DIDN'T LAST AND YOU DIDN'T HAVE MONEY TO GET MORE.: NEVER TRUE

## 2021-08-24 SDOH — ECONOMIC STABILITY: FOOD INSECURITY: WITHIN THE PAST 12 MONTHS, YOU WORRIED THAT YOUR FOOD WOULD RUN OUT BEFORE YOU GOT MONEY TO BUY MORE.: NEVER TRUE

## 2021-08-24 ASSESSMENT — SOCIAL DETERMINANTS OF HEALTH (SDOH): HOW HARD IS IT FOR YOU TO PAY FOR THE VERY BASICS LIKE FOOD, HOUSING, MEDICAL CARE, AND HEATING?: NOT HARD AT ALL

## 2021-08-24 ASSESSMENT — PATIENT HEALTH QUESTIONNAIRE - PHQ9
SUM OF ALL RESPONSES TO PHQ QUESTIONS 1-9: 0
2. FEELING DOWN, DEPRESSED OR HOPELESS: 0
1. LITTLE INTEREST OR PLEASURE IN DOING THINGS: 0
SUM OF ALL RESPONSES TO PHQ QUESTIONS 1-9: 0
SUM OF ALL RESPONSES TO PHQ9 QUESTIONS 1 & 2: 0
SUM OF ALL RESPONSES TO PHQ QUESTIONS 1-9: 0

## 2021-08-24 NOTE — PROGRESS NOTES
Chief Complaint   Patient presents with    6 Month Follow-Up     Pt here for 6 month f/up on hypertension, DM. Pt follows Dr. Sangeetha Trevino for diabetes. Pt states she's doing fine with no issues/concerns.  Health Maintenance     Discussed HDM. Pt declines dexa. HPI: Belen Coronado 67 y.o. female presenting for     Diabetes type 2  Patient follows endocrinology. Uncontrolled. Patient is not checking her sugars. Per endocrinology notes patient takes Januvia, Actos, Prandin, and glimepiride. Admits to some hypoglycemic episodes which improved with eating or drinking sugary drinks. Is active in her garden. Recently had her feet checked by her endocrinologist.  Is scheduled to have her eyes checked (no history of diabetic neuropathy or retinopathy). And patient also has a appointment with her dentist.  Lab Results   Component Value Date    LABA1C 9.0 (H) 04/23/2021     No results found for: EAG        HTN   Stable at this time. Patient take the hCTZ every other day (due to diurectic effect), cozaar 100 mg daily,  Amlodipine 5 mg in the morning and 2.5 mg in the evening. Denies any fevers, chills, nausea, vomiting, chest pain, shortness of breath, abdominal pain, change in urination, or change in stools. BP Readings from Last 20 Encounters:   08/24/21 122/62   05/03/21 138/75   02/25/21 138/70   11/17/20 (!) 150/80   08/25/20 120/60   05/19/20 (!) 152/71   02/25/20 136/60   11/19/19 (!) 159/81   08/26/19 124/70   05/14/19 (!) 145/67   02/25/19 (!) 169/72   11/12/18 (!) 160/77   08/24/18 (!) 158/62   05/14/18 (!) 164/76   04/17/18 (!) 170/68   11/14/17 (!) 164/81   10/17/17 132/68   05/15/17 (!) 170/70   04/18/17 132/70   11/14/16 (!) 158/68   ]    Cataracts  Follows up with Ophthalmology     HLD   Patient is taking the pravachol daily. Patient was on cholesterol medication in the past but was discontinued due to myopathy. Patient now takes the medication M W F. This helps to decrease her myopathy.   Lab Results   Component Value Date    CHOL 194 05/08/2020    CHOL 191 05/04/2018    CHOL 213 (H) 11/03/2017     Lab Results   Component Value Date    TRIG 56 05/08/2020    TRIG 106 05/04/2018    TRIG 84 11/03/2017     Lab Results   Component Value Date    HDL 79 (H) 05/08/2020    HDL 69 (H) 05/04/2018    HDL 71 (H) 11/03/2017     Lab Results   Component Value Date    LDLCALC 104 05/08/2020    LDLCALC 101 05/04/2018    LDLCALC 125 11/03/2017     No results found for: LABVLDL, VLDL  Lab Results   Component Value Date    CHOLHDLRATIO 4.0 11/02/2012    CHOLHDLRATIO 4.3 06/08/2012    CHOLHDLRATIO 3.7 12/16/2011     History of cold sores   Recurrent   Occurs on the face   Denies any outbreaks currently   Acyclovir ointment helped in the past with her lesions. Requesting a refill on the medication. Current Outpatient Medications   Medication Sig Dispense Refill    aspirin 81 MG EC tablet Take 81 mg by mouth daily      glimepiride (AMARYL) 2 MG tablet TAKE 1 TABLET THREE TIMES A DAY WITH MEALS 180 tablet 5    SITagliptin (JANUVIA) 100 MG tablet TAKE 1 TABLET DAILY 90 tablet 3    pravastatin (PRAVACHOL) 40 MG tablet Take 1 tablet by mouth three times a week Take 1 tab mon , wed, friday 36 tablet 1    losartan (COZAAR) 100 MG tablet Take 1 tablet by mouth daily 90 tablet 1    amLODIPine (NORVASC) 2.5 MG tablet Take 2 tablets in the morning and 1 tablet in the evening. 270 tablet 1    acyclovir (ZOVIRAX) 5 % ointment Apply 5 times daily for 4 days on the affected area. Apply a small amount. 30 g 1    repaglinide (PRANDIN) 2 MG tablet TAKE 1 TABLET THREE TIMES A  tablet 4    cyanocobalamin ([employer]S VITAMIN B12) 1000 MCG tablet Take by mouth      BIOTIN PO Take by mouth      hydroCHLOROthiazide (HYDRODIURIL) 25 MG tablet Take 1 tablet by mouth daily As needed 90 tablet 3    Compression Stockings MISC 10-20 mm Hg compression      Ascorbic Acid (VITAMIN C) 500 MG tablet Take 500 mg by mouth 2 times daily.       CATARACT REMOVAL Right     CYST REMOVAL Right 1966    HYSTERECTOMY  1996    TONSILLECTOMY  1949        Family History   Problem Relation Age of Onset    Cancer Other     Diabetes Other     Breast Cancer Maternal Aunt         Social History     Socioeconomic History    Marital status:      Spouse name: Not on file    Number of children: Not on file    Years of education: Not on file    Highest education level: Not on file   Occupational History    Not on file   Tobacco Use    Smoking status: Never Smoker    Smokeless tobacco: Never Used   Substance and Sexual Activity    Alcohol use: Not on file    Drug use: Not on file    Sexual activity: Not on file   Other Topics Concern    Not on file   Social History Narrative    Not on file     Social Determinants of Health     Financial Resource Strain: Low Risk     Difficulty of Paying Living Expenses: Not hard at all   Food Insecurity: No Food Insecurity    Worried About [address] in the Last Year: Never true    [address] [employer] in the Last Year: Never true   Transportation Needs:     Lack of Transportation (Medical):      Lack of Transportation (Non-Medical):    Physical Activity:     Days of Exercise per Week:     Minutes of Exercise per Session:    Stress:     Feeling of Stress :    Social Connections:     Frequency of Communication with Friends and Family:     Frequency of Social Gatherings with Friends and Family:     Attends Yazidi Services:     Active Member of Clubs or Organizations:     Attends Club or Organization Meetings:     Marital Status:    Intimate Partner Violence:     Fear of Current or Ex-Partner:     Emotionally Abused:     Physically Abused:     Sexually Abused:         Ht 5' (1.524 m)   Wt 120 lb 3.2 oz (54.5 kg)   LMP  (LMP Unknown)   BMI 23.47 kg/m²        Physical Exam:    General appearance - alert, well appearing, and in no distress  Mental Status - alert, oriented to person, place, and 05/04/2018    HCT 39.2 05/04/2018    MCV 94.1 05/04/2018     05/04/2018     Lab Results   Component Value Date    LABA1C 9.0 (H) 04/23/2021     No results found for: EAG      A/P: Diane Barry 67 y.o. female presenting for     1. Essential hypertension    - losartan (COZAAR) 100 MG tablet; Take 1 tablet by mouth daily  Dispense: 90 tablet; Refill: 1  - amLODIPine (NORVASC) 2.5 MG tablet; Take 2 tablets in the morning and 1 tablet in the evening. Dispense: 270 tablet; Refill: 1    2. Mixed hyperlipidemia    - pravastatin (PRAVACHOL) 40 MG tablet; Take 1 tablet by mouth three times a week Take 1 tab mon , wed, friday  Dispense: 36 tablet; Refill: 1  - Lipid, Fasting; Future    3. Onychomycosis  Patient does not want any intervention at this time. 4. Type 2 diabetes mellitus with both eyes affected by mild nonproliferative retinopathy and macular edema, without long-term current use of insulin [facility])  Follow-up with endocrinology. We will see how she does. 5. Cataract of right eye, unspecified cataract type  Follow-up with ophthalmology.

## 2021-10-22 DIAGNOSIS — E78.2 MIXED HYPERLIPIDEMIA: ICD-10-CM

## 2021-10-22 DIAGNOSIS — E11.65 UNCONTROLLED TYPE 2 DIABETES MELLITUS WITH HYPERGLYCEMIA (HCC): ICD-10-CM

## 2021-10-22 LAB
ANION GAP SERPL CALCULATED.3IONS-SCNC: 11 MEQ/L (ref 9–15)
BUN BLDV-MCNC: 14 MG/DL (ref 8–23)
CALCIUM SERPL-MCNC: 8.8 MG/DL (ref 8.5–9.9)
CHLORIDE BLD-SCNC: 104 MEQ/L (ref 95–107)
CHOLESTEROL, FASTING: 197 MG/DL (ref 0–199)
CO2: 27 MEQ/L (ref 20–31)
CREAT SERPL-MCNC: 0.71 MG/DL (ref 0.5–0.9)
CREATININE URINE: 59.4 MG/DL
GFR AFRICAN AMERICAN: >60
GFR NON-AFRICAN AMERICAN: >60
GLUCOSE BLD-MCNC: 201 MG/DL (ref 70–99)
HBA1C MFR BLD: 9 % (ref 4.8–5.9)
HDLC SERPL-MCNC: 70 MG/DL (ref 40–59)
LDL CHOLESTEROL CALCULATED: 111 MG/DL (ref 0–129)
MICROALBUMIN UR-MCNC: 3.9 MG/DL
MICROALBUMIN/CREAT UR-RTO: 65.7 MG/G (ref 0–30)
POTASSIUM SERPL-SCNC: 3.6 MEQ/L (ref 3.4–4.9)
SODIUM BLD-SCNC: 142 MEQ/L (ref 135–144)
TRIGLYCERIDE, FASTING: 81 MG/DL (ref 0–150)

## 2021-11-01 ENCOUNTER — OFFICE VISIT (OUTPATIENT)
Dept: ENDOCRINOLOGY | Age: 76
End: 2021-11-01
Payer: MEDICARE

## 2021-11-01 VITALS
SYSTOLIC BLOOD PRESSURE: 136 MMHG | HEIGHT: 60 IN | DIASTOLIC BLOOD PRESSURE: 66 MMHG | HEART RATE: 75 BPM | BODY MASS INDEX: 23.95 KG/M2 | OXYGEN SATURATION: 97 % | WEIGHT: 122 LBS

## 2021-11-01 DIAGNOSIS — E11.65 UNCONTROLLED TYPE 2 DIABETES MELLITUS WITH HYPERGLYCEMIA (HCC): Primary | ICD-10-CM

## 2021-11-01 PROCEDURE — 3052F HG A1C>EQUAL 8.0%<EQUAL 9.0%: CPT | Performed by: INTERNAL MEDICINE

## 2021-11-01 PROCEDURE — 99213 OFFICE O/P EST LOW 20 MIN: CPT | Performed by: INTERNAL MEDICINE

## 2021-11-01 NOTE — PROGRESS NOTES
11/1/2021    Assessment:       Diagnosis Orders   1. Uncontrolled type 2 diabetes mellitus with hyperglycemia (HCC)  Hemoglobin U6T    Basic Metabolic Panel, Fasting         PLAN:     Orders Placed This Encounter   Procedures    Hemoglobin A1C     Standing Status:   Future     Standing Expiration Date:   11/1/2022    Basic Metabolic Panel, Fasting     Standing Status:   Future     Standing Expiration Date:   11/1/2022     Continue current dose of Januvia glimepiride and Prandin A1c goal of less than 8    Subjective:     Chief Complaint   Patient presents with    Diabetes     Vitals:    11/01/21 1327   BP: 136/66   Pulse: 75   SpO2: 97%   Weight: 122 lb (55.3 kg)   Height: 5' (1.524 m)     Wt Readings from Last 3 Encounters:   11/01/21 122 lb (55.3 kg)   08/24/21 120 lb 3.2 oz (54.5 kg)   05/03/21 124 lb (56.2 kg)     BP Readings from Last 3 Encounters:   11/01/21 136/66   08/24/21 122/62   05/03/21 138/75     Follow-up on type 2 diabetes patient also seen ophthalmologist recently follow-up   With retinal vein occlusion with bilateral macular edema  Patient on 3 oral medications A1c is still at 9 denies any hypoglycemia complications include nephropathy    Diabetes  She presents for her follow-up diabetic visit. She has type 2 diabetes mellitus. Symptoms are stable. Diabetic complications include nephropathy. Current diabetic treatment includes oral agent (triple therapy). Her overall blood glucose range is 180-200 mg/dl. (Lab Results       Component                Value               Date                       LABA1C                   9.0 (H)             10/22/2021              ) An ACE inhibitor/angiotensin II receptor blocker is being taken. Results for Linda Vergara (MRN [id number]) as of 11/1/2021 13:48   Ref.  Range 10/22/2021 07:46   Sodium Latest Ref Range: 135 - 144 mEq/L 142   Potassium Latest Ref Range: 3.4 - 4.9 mEq/L 3.6   Chloride Latest Ref Range: 95 - 107 mEq/L 104   CO2 Latest Ref Range: 20 - 31 mEq/L 27   BUN Latest Ref Range: 8 - 23 mg/dL 14   Creatinine Latest Ref Range: 0.50 - 0.90 mg/dL 0.71   Anion Gap Latest Ref Range: 9 - 15 mEq/L 11   GFR Non- Latest Ref Range: >60  >60.0   GFR African American Latest Ref Range: >60  >60.0   Glucose Latest Ref Range: 70 - 99 mg/dL 201 (H)   Calcium Latest Ref Range: 8.5 - 9.9 mg/dL 8.8   Cholesterol, Fasting Latest Ref Range: 0 - 199 mg/dL 197   HDL Cholesterol Latest Ref Range: 40 - 59 mg/dL 70 (H)   LDL Calculated Latest Ref Range: 0 - 129 mg/dL 111   Triglyceride, Fasting Latest Ref Range: 0 - 150 mg/dL 81   Hemoglobin A1C Latest Ref Range: 4.8 - 5.9 % 9.0 (H)   Creatinine, Ur Latest Ref Range: Not Established mg/dL 59.4   Microalbumin Creatinine Ratio Latest Ref Range: 0.0 - 30.0 mg/G 65.7 (H)   Microalbumin, Random Urine Latest Ref Range: Not Established mg/dL 3.90 (H)       ASSESSMENT/PLAN    S45.1341 Tribut[place] (branch) retinal vein occlusion, bilateral, with macular edema  Comment:   - History of injections and laser about 5 years ago (2015) with Dr Kevin Patel right eye   - no history of any injections or laser in left eye     RIGHT EYE: s/p Avastin x 3 (1/5/2021)  - s/p injections in ~2015 and s/p focal in ~2015 with Dr Kevin Patel as above  - visual acuity remains at the 20/40 level  - OCT is unchanged   - suspect that this is mostly secondary to her epiretinal membrane given lack of improvement with anti-VEGF   - overall happy with visual acuity   - recommend observation     LEFT EYE:  - noted on UWFA and with very mild intraretinal fluid on OCT that has improved  - continued resolution of fluid on OCT today  - monitor    Follow up in 6-12 months; sooner if changes  - AG and warnings reviewed           Past Medical History:   Diagnosis Date    Anemia     Fatigue     Fibroids     Hyperlipidemia     Hyperthyroidism     Palpitations     Sinusitis     Type II or unspecified type diabetes mellitus without mention of complication, not stated as uncontrolled      Past Surgical History:   Procedure Laterality Date    CATARACT REMOVAL Left     CATARACT REMOVAL Right     CYST REMOVAL Right 1966    HYSTERECTOMY  1996    TONSILLECTOMY  1949     Social History     Socioeconomic History    Marital status:      Spouse name: Not on file    Number of children: Not on file    Years of education: Not on file    Highest education level: Not on file   Occupational History    Not on file   Tobacco Use    Smoking status: Never Smoker    Smokeless tobacco: Never Used   Substance and Sexual Activity    Alcohol use: Not on file    Drug use: Not on file    Sexual activity: Not on file   Other Topics Concern    Not on file   Social History Narrative    Not on file     Social Determinants of Health     Financial Resource Strain: Low Risk     Difficulty of Paying Living Expenses: Not hard at all   Food Insecurity: No Food Insecurity    Worried About Running Out of Food in the Last Year: Never true    [address] in the Last Year: Never true   Transportation Needs:     Lack of Transportation (Medical):  Lack of Transportation (Non-Medical):    Physical Activity:     Days of Exercise per Week:     Minutes of Exercise per Session:    Stress:     Feeling of Stress :    Social Connections:     Frequency of Communication with Friends and Family:     Frequency of Social Gatherings with Friends and Family:     Attends [employer] Services:     Active Member of Clubs or Organizations:     Attends Club or Organization Meetings:     Marital Status:    Intimate Partner Violence:     Fear of Current or Ex-Partner:     Emotionally Abused:     Physically Abused:     Sexually Abused:      Family History   Problem Relation Age of Onset    Cancer Other     Diabetes Other     Breast Cancer Maternal Aunt      Allergies   Allergen Reactions    Adhesive Tape      Other reaction(s):  Other: See Comments  Peels skin off     Avapro [Irbesartan]     Eggs [Egg White]     Lipitor     Morphine     Pcn [Penicillins]     Statins      High dose of anything--->achy  Other reaction(s): Myalgia  High dose of anything--->achy       Current Outpatient Medications:     aspirin 81 MG EC tablet, Take 81 mg by mouth daily, Disp: , Rfl:     losartan (COZAAR) 100 MG tablet, Take 1 tablet by mouth daily, Disp: 90 tablet, Rfl: 1    pravastatin (PRAVACHOL) 40 MG tablet, Take 1 tablet by mouth three times a week Take 1 tab mon , wed, friday, Disp: 36 tablet, Rfl: 1    amLODIPine (NORVASC) 2.5 MG tablet, Take 2 tablets in the morning and 1 tablet in the evening., Disp: 270 tablet, Rfl: 1    glimepiride (AMARYL) 2 MG tablet, TAKE 1 TABLET THREE TIMES A DAY WITH MEALS, Disp: 180 tablet, Rfl: 5    SITagliptin (JANUVIA) 100 MG tablet, TAKE 1 TABLET DAILY, Disp: 90 tablet, Rfl: 3    acyclovir (ZOVIRAX) 5 % ointment, Apply 5 times daily for 4 days on the affected area. Apply a small amount., Disp: 30 g, Rfl: 1    repaglinide (PRANDIN) 2 MG tablet, TAKE 1 TABLET THREE TIMES A DAY, Disp: 270 tablet, Rfl: 4    cyanocobalamin ([employer] VITAMIN B12) 1000 MCG tablet, Take by mouth, Disp: , Rfl:     BIOTIN PO, Take by mouth, Disp: , Rfl:     hydroCHLOROthiazide (HYDRODIURIL) 25 MG tablet, Take 1 tablet by mouth daily As needed, Disp: 90 tablet, Rfl: 3    Compression Stockings MISC, 10-20 mm Hg compression, Disp: , Rfl:     blood glucose test strips (ONE TOUCH TEST STRIPS) strip, Pt test 1x qd dx E11.65, Disp: 100 each, Rfl: 3    Alcohol Swabs (ALCOHOL PREP) 70 % PADS, bid, Disp: 100 each, Rfl: 06    Ascorbic Acid (VITAMIN C) 500 MG tablet, Take 500 mg by mouth 2 times daily. , Disp: , Rfl:     vitamin D (CHOLECALCIFEROL) 1000 UNIT TABS tablet, Take 1,000 Units by mouth daily. , Disp: , Rfl:     ONETOUCH DELICA LANCETS MISC, Test bid, Disp: 100 each, Rfl: 11    vitamin E 100 UNITS capsule, Take 800 Units by mouth daily. , Disp: , Rfl:     Multiple Vitamin (DAILY VITAMIN PO), Take  by mouth.  , Disp: , Rfl:     fish oil-omega-3 fatty acids 1000 MG capsule, Take 1 g by mouth daily. , Disp: , Rfl:   Lab Results   Component Value Date     10/22/2021    K 3.6 10/22/2021     10/22/2021    CO2 27 10/22/2021    BUN 14 10/22/2021    CREATININE 0.71 10/22/2021    GLUCOSE 201 (H) 10/22/2021    CALCIUM 8.8 10/22/2021    PROT 6.8 08/24/2018    LABALBU 4.3 08/24/2018    BILITOT 0.3 08/24/2018    ALKPHOS 55 08/24/2018    AST 21 08/24/2018    ALT 21 08/24/2018    LABGLOM >60.0 10/22/2021    GFRAA >60.0 10/22/2021    GLOB 2.5 08/24/2018     Lab Results   Component Value Date    WBC 4.3 (L) 05/04/2018    HGB 13.1 05/04/2018    HCT 39.2 05/04/2018    MCV 94.1 05/04/2018     05/04/2018     Lab Results   Component Value Date    LABA1C 9.0 (H) 10/22/2021    LABA1C 9.0 (H) 04/23/2021    LABA1C 8.5 (H) 11/06/2020     Lab Results   Component Value Date    CHOLFAST 197 10/22/2021    TRIGLYCFAST 81 10/22/2021    HDL 70 (H) 10/22/2021    HDL 79 (H) 05/08/2020    HDL 69 (H) 05/04/2018    LDLCALC 111 10/22/2021    LDLCALC 104 05/08/2020    LDLCALC 101 05/04/2018    CHOL 194 05/08/2020    CHOL 191 05/04/2018    CHOL 213 (H) 11/03/2017    TRIG 56 05/08/2020    TRIG 106 05/04/2018    TRIG 84 11/03/2017     No results found for: TESTM  Lab Results   Component Value Date    TSH 3.900 11/02/2018    TSH 4.850 (H) 05/04/2018    TSH 4.890 (H) 11/03/2017    T4FREE 1.24 11/02/2018    T4FREE 1.27 05/04/2018    T4FREE 1.06 11/03/2017       Review of Systems   Eyes: Positive for visual disturbance. Cardiovascular: Negative. Endocrine: Negative. All other systems reviewed and are negative. Objective:   Physical Exam  Vitals reviewed. Constitutional:       Appearance: Normal appearance. She is normal weight. HENT:      Head: Normocephalic and atraumatic.  Hair is normal.      Right Ear: External ear normal.      Left Ear: External ear normal.      Nose: Nose normal.   Eyes: General: No scleral icterus. Right eye: No discharge. Left eye: No discharge. Extraocular Movements: Extraocular movements intact. Conjunctiva/sclera: Conjunctivae normal.   Neck:      Trachea: Trachea normal.   Cardiovascular:      Rate and Rhythm: Normal rate. Pulmonary:      Effort: Pulmonary effort is normal.   Musculoskeletal:         General: Normal range of motion. Cervical back: Normal range of motion and neck supple. Neurological:      General: No focal deficit present. Mental Status: She is alert.    Psychiatric:         Mood and Affect: Mood normal.         Behavior: Behavior normal.

## 2021-12-06 RX ORDER — REPAGLINIDE 2 MG/1
TABLET ORAL
Qty: 270 TABLET | Refills: 4 | Status: SHIPPED | OUTPATIENT
Start: 2021-12-06

## 2022-02-24 ENCOUNTER — OFFICE VISIT (OUTPATIENT)
Dept: FAMILY MEDICINE CLINIC | Age: 77
End: 2022-02-24
Payer: MEDICARE

## 2022-02-24 VITALS
OXYGEN SATURATION: 99 % | WEIGHT: 120 LBS | BODY MASS INDEX: 23.56 KG/M2 | SYSTOLIC BLOOD PRESSURE: 134 MMHG | TEMPERATURE: 97.4 F | HEART RATE: 66 BPM | DIASTOLIC BLOOD PRESSURE: 64 MMHG | HEIGHT: 60 IN

## 2022-02-24 DIAGNOSIS — E78.2 MIXED HYPERLIPIDEMIA: ICD-10-CM

## 2022-02-24 DIAGNOSIS — I65.21 STENOSIS OF RIGHT CAROTID ARTERY: ICD-10-CM

## 2022-02-24 DIAGNOSIS — I10 ESSENTIAL HYPERTENSION: ICD-10-CM

## 2022-02-24 DIAGNOSIS — H26.9 CATARACT OF RIGHT EYE, UNSPECIFIED CATARACT TYPE: ICD-10-CM

## 2022-02-24 DIAGNOSIS — E11.65 UNCONTROLLED TYPE 2 DIABETES MELLITUS WITH HYPERGLYCEMIA (HCC): ICD-10-CM

## 2022-02-24 DIAGNOSIS — H35.30 MACULAR DEGENERATION, UNSPECIFIED LATERALITY, UNSPECIFIED TYPE: ICD-10-CM

## 2022-02-24 DIAGNOSIS — Z12.31 ENCOUNTER FOR SCREENING MAMMOGRAM FOR MALIGNANT NEOPLASM OF BREAST: Primary | ICD-10-CM

## 2022-02-24 DIAGNOSIS — Z78.0 POSTMENOPAUSAL: ICD-10-CM

## 2022-02-24 DIAGNOSIS — Z86.19 H/O COLD SORES: ICD-10-CM

## 2022-02-24 DIAGNOSIS — Z00.00 MEDICARE ANNUAL WELLNESS VISIT, SUBSEQUENT: Primary | ICD-10-CM

## 2022-02-24 PROBLEM — D21.9 FIBROIDS: Status: ACTIVE | Noted: 2020-07-27

## 2022-02-24 PROCEDURE — G0439 PPPS, SUBSEQ VISIT: HCPCS | Performed by: FAMILY MEDICINE

## 2022-02-24 PROCEDURE — 99214 OFFICE O/P EST MOD 30 MIN: CPT | Performed by: FAMILY MEDICINE

## 2022-02-24 RX ORDER — AMLODIPINE BESYLATE 2.5 MG/1
TABLET ORAL
Qty: 270 TABLET | Refills: 1 | Status: SHIPPED | OUTPATIENT
Start: 2022-02-24 | End: 2022-08-25 | Stop reason: SDUPTHER

## 2022-02-24 RX ORDER — PRAVASTATIN SODIUM 40 MG
40 TABLET ORAL
Qty: 36 TABLET | Refills: 1 | Status: SHIPPED | OUTPATIENT
Start: 2022-02-25 | End: 2022-08-25 | Stop reason: SDUPTHER

## 2022-02-24 RX ORDER — LOSARTAN POTASSIUM 100 MG/1
100 TABLET ORAL DAILY
Qty: 90 TABLET | Refills: 1 | Status: SHIPPED | OUTPATIENT
Start: 2022-02-24 | End: 2022-08-25 | Stop reason: SDUPTHER

## 2022-02-24 ASSESSMENT — PATIENT HEALTH QUESTIONNAIRE - PHQ9
SUM OF ALL RESPONSES TO PHQ QUESTIONS 1-9: 0
2. FEELING DOWN, DEPRESSED OR HOPELESS: 0
1. LITTLE INTEREST OR PLEASURE IN DOING THINGS: 0
SUM OF ALL RESPONSES TO PHQ9 QUESTIONS 1 & 2: 0
SUM OF ALL RESPONSES TO PHQ QUESTIONS 1-9: 0

## 2022-02-24 ASSESSMENT — LIFESTYLE VARIABLES: HOW OFTEN DO YOU HAVE A DRINK CONTAINING ALCOHOL: NEVER

## 2022-02-24 NOTE — PROGRESS NOTES
Medicare Annual Wellness Visit    Félix Marinelli is here for Medicare AWV    Assessment & Plan   Stacy Lamar was seen today for medicare awv. Diagnoses and all orders for this visit:    Medicare annual wellness visit, subsequent         Recommendations for Preventive Services Due: see orders and patient instructions/AVS.  Recommended screening schedule for the next 5-10 years is provided to the patient in written form: see Patient Instructions/AVS.     Return for Medicare Annual Wellness Visit in 1 year. Subjective       Patient's complete Health Risk Assessment and screening values have been reviewed and are found in Flowsheets. The following problems were reviewed today and where indicated follow up appointments were made and/or referrals ordered. Positive Risk Factor Screenings with Interventions:     Cognitive:   Words recalled: 3 Words Recalled  Clock Drawing Test (CDT): Normal  Total Score Interpretation: Abnormal Mini-Cog  Did the patient refuse to take the cognition test?: No    Cognitive Impairment Interventions:  · recalled the words             General Health and ACP:  General  In general, how would you say your health is?: Good  In the past 7 days, have you experienced any of the following: New or Increased Pain, New or Increased Fatigue, Loneliness, Social Isolation, Stress or Anger?: (!) Yes  Select all that apply: (!) New or Increased Fatigue,Social Isolation,Stress,Anger,Loneliness,New or Increased Pain (Due to pandemic)  Do you get the social and emotional support that you need?: Yes  Do you have a Living Will?: Yes    Advance Directives     Power of  Living Will ACP-Advance Directive ACP-Power of     Not on File Not on File Not on File Not on File      General Health Risk Interventions:  · does not want any futher intervention     Hearing/Vision:  Do you or your family notice any trouble with your hearing that hasn't been managed with hearing aids?: No  Do you have difficulty Problem: Knowledge Deficit  Goal: Knowledge of disease process/condition, treatment plan, diagnostic tests, and medications will improve  Outcome: PROGRESSING SLOWER THAN EXPECTED  Pt is confused and reoriented as needed. Encourage pt to participate in POC and to ask questions as needed.     Problem: Skin Integrity  Goal: Risk for impaired skin integrity will decrease  Outcome: PROGRESSING AS EXPECTED  Pts skin assessed for breakdown and risk for breakdown. Measures in place to prevent breakdown.          driving, watching TV, or doing any of your daily activities because of your eyesight?: (!) Yes  Have you had an eye exam within the past year?: Yes  No exam data present    Hearing/Vision Interventions:  · Vision concerns:  ophthalmology/optometry referral provided            Objective   There were no vitals filed for this visit. There is no height or weight on file to calculate BMI. Allergies   Allergen Reactions    Adhesive Tape      Other reaction(s): Other: See Comments  Peels skin off     Avapro [Irbesartan]     Eggs [Egg White]     Lipitor     Morphine     Pcn [Penicillins]     Statins      High dose of anything--->achy  Other reaction(s): Myalgia  High dose of anything--->achy     Prior to Visit Medications    Medication Sig Taking? Authorizing Provider   repaglinide (PRANDIN) 2 MG tablet TAKE 1 TABLET THREE TIMES A DAY Yes Lei Clancy MD   aspirin 81 MG EC tablet Take 81 mg by mouth daily Yes Historical Provider, MD   glimepiride (AMARYL) 2 MG tablet TAKE 1 TABLET THREE TIMES A DAY WITH MEALS Yes Lei Clancy MD   SITagliptin (JANUVIA) 100 MG tablet TAKE 1 TABLET DAILY Yes Lei Clancy MD   acyclovir (ZOVIRAX) 5 % ointment Apply 5 times daily for 4 days on the affected area. Apply a small amount. Yes Lamont Reese MD   cyanocobalamin ([employer] VITAMIN B12) 1000 MCG tablet Take by mouth Yes Historical Provider, MD   BIOTIN PO Take by mouth Yes Historical Provider, MD   hydroCHLOROthiazide (HYDRODIURIL) 25 MG tablet Take 1 tablet by mouth daily As needed Yes Lamont Reese MD   Compression Stockings MISC 10-20 mm Hg compression Yes Historical Provider, MD   blood glucose test strips (ONE TOUCH TEST STRIPS) strip Pt test 1x qd dx E11.65 Yes Lei Clancy MD   Alcohol Swabs (ALCOHOL PREP) 70 % PADS bid Yes Wesley Velez MD   Ascorbic Acid (VITAMIN C) 500 MG tablet Take 500 mg by mouth 2 times daily.  Yes Historical Provider, MD   vitamin D (CHOLECALCIFEROL) 1000 UNIT TABS tablet Take 1,000 Units by mouth daily. Yes Historical Provider, MD King Barrett LANCETS MISC Test bid Yes Mark Romano MD   vitamin E 100 UNITS capsule Take 800 Units by mouth daily. Yes Historical Provider, MD   Multiple Vitamin (DAILY VITAMIN PO) Take  by mouth. Yes Historical Provider, MD   fish oil-omega-3 fatty acids 1000 MG capsule Take 1 g by mouth daily. Yes Historical Provider, MD   losartan (COZAAR) 100 MG tablet Take 1 tablet by mouth daily  Carolyn Lloyd MD   pravastatin (PRAVACHOL) 40 MG tablet Take 1 tablet by mouth three times a week Take 1 tab mon , wed, Rachel Ozuna MD   amLODIPine (NORVASC) 2.5 MG tablet Take 2 tablets PO in the morning and 1 tablet PO in the evening. Carolyn Lloyd MD       [facility] (Including outside providers/suppliers regularly involved in providing care):   Patient Care Team:  Carolyn Lloyd MD as PCP - General (Family Medicine)  Carolyn Lloyd MD as PCP - [facility] Empaneled Provider    Reviewed and updated this visit:  Meds       Spent 30 minutes on the encounter.

## 2022-02-24 NOTE — PATIENT INSTRUCTIONS
Personalized Preventive Plan for Zetta Skiff - 2/24/2022  Medicare offers a range of preventive health benefits. Some of the tests and screenings are paid in full while other may be subject to a deductible, co-insurance, and/or copay. Some of these benefits include a comprehensive review of your medical history including lifestyle, illnesses that may run in your family, and various assessments and screenings as appropriate. After reviewing your medical record and screening and assessments performed today your provider may have ordered immunizations, labs, imaging, and/or referrals for you. A list of these orders (if applicable) as well as your Preventive Care list are included within your After Visit Summary for your review. Other Preventive Recommendations:    · A preventive eye exam performed by an eye specialist is recommended every 1-2 years to screen for glaucoma; cataracts, macular degeneration, and other eye disorders. · A preventive dental visit is recommended every 6 months. · Try to get at least 150 minutes of exercise per week or 10,000 steps per day on a pedometer . · Order or download the FREE \"Exercise & Physical Activity: Your Everyday Guide\" from [employer]. Call 7-266.321.5136 or search [employer] online. · You need 4136-1972 mg of calcium and 4893-8656 IU of vitamin D per day. It is possible to meet your calcium requirement with diet alone, but a vitamin D supplement is usually necessary to meet this goal.  · When exposed to the sun, use a sunscreen that protects against both UVA and UVB radiation with an SPF of 30 or greater. Reapply every 2 to 3 hours or after sweating, drying off with a towel, or swimming. · Always wear a seat belt when traveling in a car. Always wear a helmet when riding a bicycle or motorcycle.

## 2022-02-24 NOTE — PROGRESS NOTES
Chief Complaint   Patient presents with    6 Month Follow-Up    Hypertension     Pt rarely checks BP. Last time she checked it was 137/64 on right arm & 128/59 on left arm.  Diabetes     Sees Dr. Jose Nelson for DM management. Active A1C order.  Health Maintenance     Needs mammogram ordered. HPI: Tereso Rittre 67 y.o. female presenting for     Chest twinge  Patient is complaining of the frequency of chest twinging. Brief episodes. Can happen at any time. Patient is unsure if is related to her blood pressure medicines. Denies any radiation the arm. Denies any other symptoms associated with it. Patient has not seen a cardiologist and would like to hold off at this time. Diabetes type 2  Patient follows endocrinology. Uncontrolled. Patient is not checking her sugars. Per endocrinology notes patient takes Januvia, Actos, Prandin, and glimepiride. Admits to some hypoglycemic episodes which improved with eating or drinking sugary drinks. Is active in her garden. Recently had her feet checked by her endocrinologist.  Is scheduled to have her eyes checked (no history of diabetic neuropathy or retinopathy). Follow-up  Patient does not check her sugar levels. Would like to talk with her endocrinologist to see if she can have a sensor and reader so that she does not have to poke herself as often. Lab Results   Component Value Date    LABA1C 9.0 (H) 10/22/2021     No results found for: EAG      HTN   Stable at this time. Patient take the hCTZ every other day (due to diurectic effect), cozaar 100 mg daily,  Amlodipine 5 mg in the morning and 2.5 mg in the evening. Denies any fevers, chills, nausea, vomiting, chest pain, shortness of breath, abdominal pain, change in urination, or change in stools.   BP Readings from Last 20 Encounters:   02/24/22 134/64   11/01/21 136/66   08/24/21 122/62   05/03/21 138/75   02/25/21 138/70   11/17/20 (!) 150/80   08/25/20 120/60   05/19/20 (!) 152/71 02/25/20 136/60   11/19/19 (!) 159/81   08/26/19 124/70   05/14/19 (!) 145/67   02/25/19 (!) 169/72   11/12/18 (!) 160/77   08/24/18 (!) 158/62   05/14/18 (!) 164/76   04/17/18 (!) 170/68   11/14/17 (!) 164/81   10/17/17 132/68   05/15/17 (!) 170/70   ]    Cataracts  Follows up with Ophthalmology     HLD   Patient is taking the pravachol daily. Patient was on cholesterol medication in the past but was discontinued due to myopathy. Patient now takes the medication M W F. This helps to decrease her myopathy. Lab Results   Component Value Date    CHOL 194 05/08/2020    CHOL 191 05/04/2018    CHOL 213 (H) 11/03/2017     Lab Results   Component Value Date    TRIG 56 05/08/2020    TRIG 106 05/04/2018    TRIG 84 11/03/2017     Lab Results   Component Value Date    HDL 70 (H) 10/22/2021    HDL 79 (H) 05/08/2020    HDL 69 (H) 05/04/2018     Lab Results   Component Value Date    LDLCALC 111 10/22/2021    LDLCALC 104 05/08/2020    LDLCALC 101 05/04/2018     No results found for: LABVLDL, VLDL  Lab Results   Component Value Date    CHOLHDLRATIO 4.0 11/02/2012    CHOLHDLRATIO 4.3 06/08/2012    CHOLHDLRATIO 3.7 12/16/2011     History of cold sores   Recurrent   Occurs on the face   Denies any outbreaks currently   Acyclovir ointment helped in the past with her lesions. Requesting a refill on the medication. Follow  Stable. Current Outpatient Medications   Medication Sig Dispense Refill    repaglinide (PRANDIN) 2 MG tablet TAKE 1 TABLET THREE TIMES A  tablet 4    aspirin 81 MG EC tablet Take 81 mg by mouth daily      losartan (COZAAR) 100 MG tablet Take 1 tablet by mouth daily 90 tablet 1    pravastatin (PRAVACHOL) 40 MG tablet Take 1 tablet by mouth three times a week Take 1 tab mon , wed, friday 36 tablet 1    amLODIPine (NORVASC) 2.5 MG tablet Take 2 tablets in the morning and 1 tablet in the evening.  270 tablet 1    glimepiride (AMARYL) 2 MG tablet TAKE 1 TABLET THREE TIMES A DAY WITH MEALS 180 tablet 5    SITagliptin (JANUVIA) 100 MG tablet TAKE 1 TABLET DAILY 90 tablet 3    acyclovir (ZOVIRAX) 5 % ointment Apply 5 times daily for 4 days on the affected area. Apply a small amount. 30 g 1    cyanocobalamin ([employer] VITAMIN B12) 1000 MCG tablet Take by mouth      BIOTIN PO Take by mouth      hydroCHLOROthiazide (HYDRODIURIL) 25 MG tablet Take 1 tablet by mouth daily As needed 90 tablet 3    Compression Stockings MISC 10-20 mm Hg compression      blood glucose test strips (ONE TOUCH TEST STRIPS) strip Pt test 1x qd dx E11.65 100 each 3    Alcohol Swabs (ALCOHOL PREP) 70 % PADS bid 100 each 06    Ascorbic Acid (VITAMIN C) 500 MG tablet Take 500 mg by mouth 2 times daily.  vitamin D (CHOLECALCIFEROL) 1000 UNIT TABS tablet Take 1,000 Units by mouth daily.  ONETOUCH DELICA LANCETS MISC Test bid 100 each 11    vitamin E 100 UNITS capsule Take 800 Units by mouth daily.  Multiple Vitamin (DAILY VITAMIN PO) Take  by mouth.  fish oil-omega-3 fatty acids 1000 MG capsule Take 1 g by mouth daily. No current facility-administered medications for this visit. ROS  CONSTITUTIONAL: The patient denies fevers, chills, sweats and body ache. HEENT: Denies headache, blurry vision, eye pain, tinnitus, vertigo,  sore throat, neck or thyroid masses. RESPIRATORY: Denies cough, sputum, hemoptysis. CARDIAC: Admits to a twinge in her chest, denies pressure, palpitations, Denies lower extremity edema. GASTROINTESTINAL: Denies abdominal pain, constipation, diarrhea, bleeding in the stools,   GENITOURINARY: Denies dysuria, hematuria, nocturia or frequency, urinary incontinence. NEUROLOGIC: Denies headaches, dizziness, syncope, weakness  MUSCULOSKELETAL: denies changes in range of motion, joint pain, stiffness. ENDOCRINOLOGY: Denies heat or cold intolerance. HEMATOLOGY: Denies easy bleeding or blood transfusion,anemia  DERMATOLOGY: Denies changes in moles or pigmentation changes.  Admits to a history of cold sores. PSYCHIATRY: Denies depression, agitation or anxiety. Past Medical History:   Diagnosis Date    Anemia     Fatigue     Fibroids     Hyperlipidemia     Hyperthyroidism     Palpitations     Sinusitis     Type II or unspecified type diabetes mellitus without mention of complication, not stated as uncontrolled         Past Surgical History:   Procedure Laterality Date    CATARACT REMOVAL Left     CATARACT REMOVAL Right     CYST REMOVAL Right 1966    HYSTERECTOMY  1996    TONSILLECTOMY  1949        Family History   Problem Relation Age of Onset    Cancer Other     Diabetes Other     Breast Cancer Maternal Aunt         Social History     Socioeconomic History    Marital status:      Spouse name: Not on file    Number of children: Not on file    Years of education: Not on file    Highest education level: Not on file   Occupational History    Not on file   Tobacco Use    Smoking status: Never Smoker    Smokeless tobacco: Never Used   Substance and Sexual Activity    Alcohol use: Not on file    Drug use: Not on file    Sexual activity: Not on file   Other Topics Concern    Not on file   Social History Narrative    Not on file     Social Determinants of Health     Financial Resource Strain: Low Risk     Difficulty of Paying Living Expenses: Not hard at all   Food Insecurity: No Food Insecurity    Worried About Running Out of Food in the Last Year: Never true    [place] of Food in the Last Year: Never true   Transportation Needs:     Lack of Transportation (Medical): Not on file    Lack of Transportation (Non-Medical):  Not on file   Physical Activity: Insufficiently Active    Days of Exercise per Week: 1 day    Minutes of Exercise per Session: 120 min   Stress:     Feeling of Stress : Not on file   Social Connections:     Frequency of Communication with Friends and Family: Not on file    Frequency of Social Gatherings with Friends and Family: Not on file  Attends Moravian Services: Not on file    Active Member of Clubs or Organizations: Not on file    Attends Club or Organization Meetings: Not on file    Marital Status: Not on file   Intimate Partner Violence:     Fear of Current or Ex-Partner: Not on file    Emotionally Abused: Not on file    Physically Abused: Not on file    Sexually Abused: Not on file   Housing Stability:     Unable to Pay for Housing in the Last Year: Not on file    Number of [place] in the Last Year: Not on file    Unstable Housing in the Last Year: Not on file        /64   Pulse 66   Temp 97.4 °F (36.3 °C) (Temporal)   Ht 5' (1.524 m)   Wt 120 lb (54.4 kg)   LMP  (LMP Unknown)   SpO2 99%   BMI 23.44 kg/m²        Physical Exam:    General appearance - alert, well appearing, and in no distress  Mental Status - alert, oriented to person, place, and time  Eyes - pupils equal and reactive, extraocular eye movements intact   Ears - bilateral TM's and external ear canals normal   Nose - normal and patent, no erythema, discharge or polyps   Sinuses - Normal paranasal sinuses without tenderness   Throat - mucous membranes moist, pharynx normal without lesions   Neck - supple, no significant adenopathy   Thyroid - thyroid is normal in size without nodules or tenderness    Chest - clear to auscultation, no wheezes, rales or rhonchi, symmetric air entry   Heart - normal rate, regular rhythm, normal S1, S2, no murmurs, rubs, clicks or gallops  Abdomen - soft, nontender, nondistended, no masses or organomegaly   Back exam - full range of motion, no tenderness, palpable spasm or pain on motion   Neurological - alert, oriented, normal speech, no focal findings or movement disorder noted   Musculoskeletal - no joint tenderness, deformity or swelling   Extremities - peripheral pulses normal, no pedal edema, no clubbing or cyanosis   Skin - normal coloration and turgor, no rashes, no suspicious skin lesions noted.   Yellow hypertrophic toenails. Labs   No results found for: TSHREFLEX  TSH   Date Value Ref Range Status   11/02/2018 3.900 0.270 - 4.200 uIU/mL Final   05/04/2018 4.850 (H) 0.270 - 4.200 uIU/mL Final   11/03/2017 4.890 (H) 0.270 - 4.200 uIU/mL Final   05/05/2017 4.450 (H) 0.270 - 4.200 uIU/mL Final   09/26/2013 3.265 0.550 - 4.780 uIU/mL Final     Lab Results   Component Value Date     10/22/2021    K 3.6 10/22/2021     10/22/2021    CO2 27 10/22/2021    BUN 14 10/22/2021    CREATININE 0.71 10/22/2021    GLUCOSE 201 (H) 10/22/2021    CALCIUM 8.8 10/22/2021    PROT 6.8 08/24/2018    LABALBU 4.3 08/24/2018    BILITOT 0.3 08/24/2018    ALKPHOS 55 08/24/2018    AST 21 08/24/2018    ALT 21 08/24/2018    LABGLOM >60.0 10/22/2021    GFRAA >60.0 10/22/2021    GLOB 2.5 08/24/2018       Lab Results   Component Value Date    WBC 4.3 (L) 05/04/2018    HGB 13.1 05/04/2018    HCT 39.2 05/04/2018    MCV 94.1 05/04/2018     05/04/2018     Lab Results   Component Value Date    LABA1C 9.0 (H) 10/22/2021     No results found for: EAG      A/P: Amalia Chinchilla 67 y.o. female presenting for     1. Essential hypertension    - losartan (COZAAR) 100 MG tablet; Take 1 tablet by mouth daily  Dispense: 90 tablet; Refill: 1  - amLODIPine (NORVASC) 2.5 MG tablet; Take 2 tablets PO in the morning and 1 tablet PO in the evening. Dispense: 270 tablet; Refill: 1    2. Mixed hyperlipidemia    - pravastatin (PRAVACHOL) 40 MG tablet; Take 1 tablet by mouth three times a week Take 1 tab mon , wed, friday  Dispense: 36 tablet; Refill: 1    3. Encounter for screening mammogram for malignant neoplasm of breast    - GAURI DIGITAL SCREEN W OR WO CAD BILATERAL; Future    4. Uncontrolled type 2 diabetes mellitus with hyperglycemia [facility])  Follow-up with endocrinology. Uncontrolled A1c. Likely secondary to the inability to check her sugars. Patient is going to talk with her endocrinologist to see if she can have a sensor reader.   Will place will help to promote compliance. 5. Cataract of right eye, unspecified cataract type  Follow-up with ophthalmology    6. Macular degeneration, unspecified laterality, unspecified type  Follow-up with dermatology    7. H/O cold sores  Stable    8. Stenosis of right carotid artery  Stable. Will need to order an ultrasound carotid at next visit. Last one was in 2017. Continue with aspirin and cholesterol medicine.     9. Postmenopausal  Stable  - DEXA BONE DENSITY AXIAL SKELETON; Future

## 2022-03-10 ENCOUNTER — HOSPITAL ENCOUNTER (OUTPATIENT)
Dept: WOMENS IMAGING | Age: 77
Discharge: HOME | End: 2022-03-12
Payer: MEDICARE

## 2022-03-10 VITALS — WEIGHT: 120 LBS | BODY MASS INDEX: 23.56 KG/M2 | HEIGHT: 60 IN

## 2022-03-10 DIAGNOSIS — Z12.31 ENCOUNTER FOR SCREENING MAMMOGRAM FOR MALIGNANT NEOPLASM OF BREAST: ICD-10-CM

## 2022-03-10 DIAGNOSIS — Z78.0 POSTMENOPAUSAL: ICD-10-CM

## 2022-03-10 PROCEDURE — 77080 DXA BONE DENSITY AXIAL: CPT

## 2022-03-10 PROCEDURE — 77067 SCR MAMMO BI INCL CAD: CPT

## 2022-04-22 DIAGNOSIS — E11.65 UNCONTROLLED TYPE 2 DIABETES MELLITUS WITH HYPERGLYCEMIA (HCC): ICD-10-CM

## 2022-04-22 LAB
ANION GAP SERPL CALCULATED.3IONS-SCNC: 14 MEQ/L (ref 9–15)
BUN BLDV-MCNC: 11 MG/DL (ref 8–23)
CALCIUM SERPL-MCNC: 9.4 MG/DL (ref 8.5–9.9)
CHLORIDE BLD-SCNC: 104 MEQ/L (ref 95–107)
CO2: 23 MEQ/L (ref 20–31)
CREAT SERPL-MCNC: 0.74 MG/DL (ref 0.5–0.9)
GFR AFRICAN AMERICAN: >60
GFR NON-AFRICAN AMERICAN: >60
GLUCOSE FASTING: 182 MG/DL (ref 70–99)
HBA1C MFR BLD: 9.3 % (ref 4.8–5.9)
POTASSIUM SERPL-SCNC: 3.5 MEQ/L (ref 3.4–4.9)
SODIUM BLD-SCNC: 141 MEQ/L (ref 135–144)

## 2022-05-02 ENCOUNTER — OFFICE VISIT (OUTPATIENT)
Dept: ENDOCRINOLOGY | Age: 77
End: 2022-05-02
Payer: MEDICARE

## 2022-05-02 VITALS
SYSTOLIC BLOOD PRESSURE: 122 MMHG | BODY MASS INDEX: 23.95 KG/M2 | WEIGHT: 122 LBS | HEIGHT: 60 IN | HEART RATE: 84 BPM | OXYGEN SATURATION: 96 % | DIASTOLIC BLOOD PRESSURE: 55 MMHG

## 2022-05-02 DIAGNOSIS — E11.65 UNCONTROLLED TYPE 2 DIABETES MELLITUS WITH HYPERGLYCEMIA (HCC): Primary | ICD-10-CM

## 2022-05-02 PROCEDURE — 99213 OFFICE O/P EST LOW 20 MIN: CPT | Performed by: INTERNAL MEDICINE

## 2022-05-02 PROCEDURE — 3046F HEMOGLOBIN A1C LEVEL >9.0%: CPT | Performed by: INTERNAL MEDICINE

## 2022-05-02 RX ORDER — CALCIUM CARBONATE 500(1250)
500 TABLET ORAL DAILY
COMMUNITY

## 2022-05-02 NOTE — PROGRESS NOTES
5/2/2022    Assessment:       Diagnosis Orders   1. Uncontrolled type 2 diabetes mellitus with hyperglycemia (HCC)           PLAN:     Orders Placed This Encounter   Procedures    Hemoglobin A1C     Standing Status:   Future     Standing Expiration Date:   5/2/2023    Basic Metabolic Panel, Fasting     Standing Status:   Future     Standing Expiration Date:   5/2/2023    Microalbumin / Creatinine Urine Ratio     Standing Status:   Future     Standing Expiration Date:   5/2/2023     Orders Placed This Encounter   Medications    SITagliptin (JANUVIA) 100 MG tablet     Sig: TAKE 1 TABLET DAILY     Dispense:  90 tablet     Refill:  3     Continue glimepiride 2 mg twice a day  Continue Prandin 2 mg 3 times daily  Follow-up in 6 months    Subjective:     Chief Complaint   Patient presents with    Diabetes     Vitals:    05/02/22 1309 05/02/22 1315   BP: (!) 122/55 (!) 122/55   Pulse: 84    SpO2: 96%    Weight: 122 lb (55.3 kg)    Height: 5' (1.524 m)      Wt Readings from Last 3 Encounters:   05/02/22 122 lb (55.3 kg)   03/10/22 120 lb (54.4 kg)   02/24/22 120 lb (54.4 kg)     BP Readings from Last 3 Encounters:   05/02/22 (!) 122/55   02/24/22 134/64   11/01/21 136/66     Follow-up on type 2 diabetes patient on 3 oral medications Januvia Prandin glimepiride denies any hypoglycemia  Hemoglobin A1c was 9.3  Will require close to 200  Testing very infrequently    Diabetes  She presents for her follow-up diabetic visit. She has type 2 diabetes mellitus. Associated symptoms include fatigue. Symptoms are stable. Current diabetic treatment includes oral agent (triple therapy). Her overall blood glucose range is >200 mg/dl.  (Lab Results       Component                Value               Date                       LABA1C                   9.3 (H)             04/22/2022              )     Past Medical History:   Diagnosis Date    Anemia     Fatigue     Fibroids     Hyperlipidemia     Hyperthyroidism     Palpitations  Sinusitis     Type II or unspecified type diabetes mellitus without mention of complication, not stated as uncontrolled      Past Surgical History:   Procedure Laterality Date    CATARACT REMOVAL Left     CATARACT REMOVAL Right     CYST REMOVAL Right 1966    HYSTERECTOMY  1996    OVARY REMOVAL      TONSILLECTOMY  1949     Social History     Socioeconomic History    Marital status:      Spouse name: Not on file    Number of children: Not on file    Years of education: Not on file    Highest education level: Not on file   Occupational History    Not on file   Tobacco Use    Smoking status: Never Smoker    Smokeless tobacco: Never Used   Substance and Sexual Activity    Alcohol use: Not on file    Drug use: Not on file    Sexual activity: Not on file   Other Topics Concern    Not on file   Social History Narrative    Not on file     Social Determinants of Health     Financial Resource Strain: Low Risk     Difficulty of Paying Living Expenses: Not hard at all   Food Insecurity: No Food Insecurity    Worried About [address] in the Last Year: Never true    [facility] in the Last Year: Never true   Transportation Needs:     Lack of Transportation (Medical): Not on file    Lack of Transportation (Non-Medical):  Not on file   Physical Activity: Insufficiently Active    Days of Exercise per Week: 1 day    Minutes of Exercise per Session: 120 min   Stress:     Feeling of Stress : Not on file   Social Connections:     Frequency of Communication with Friends and Family: Not on file    Frequency of Social Gatherings with Friends and Family: Not on file    Attends Buddhist Services: Not on file    Active Member of Clubs or Organizations: Not on file    Attends Club or Organization Meetings: Not on file    Marital Status: Not on file   Intimate Partner Violence:     Fear of Current or Ex-Partner: Not on file    Emotionally Abused: Not on file    Physically Abused: Not on file    Sexually Abused: Not on file   Housing Stability:     Unable to Pay for Housing in the Last Year: Not on file    Number of Places Lived in the Last Year: Not on file    Unstable Housing in the Last Year: Not on file     Family History   Problem Relation Age of Onset    Cancer Other     Diabetes Other     Breast Cancer Maternal Aunt     Breast Cancer Maternal Aunt      Allergies   Allergen Reactions    Adhesive Tape      Other reaction(s): Other: See Comments  Peels skin off     Avapro [Irbesartan]     Eggs [Egg White]     Lipitor     Morphine     Pcn [Penicillins]     Statins      High dose of anything--->achy  Other reaction(s): Myalgia  High dose of anything--->achy       Current Outpatient Medications:     calcium carbonate (OSCAL) 500 MG TABS tablet, Take 500 mg by mouth daily, Disp: , Rfl:     losartan (COZAAR) 100 MG tablet, Take 1 tablet by mouth daily, Disp: 90 tablet, Rfl: 1    pravastatin (PRAVACHOL) 40 MG tablet, Take 1 tablet by mouth three times a week Take 1 tab mon , wed, friday, Disp: 36 tablet, Rfl: 1    amLODIPine (NORVASC) 2.5 MG tablet, Take 2 tablets PO in the morning and 1 tablet PO in the evening., Disp: 270 tablet, Rfl: 1    repaglinide (PRANDIN) 2 MG tablet, TAKE 1 TABLET THREE TIMES A DAY, Disp: 270 tablet, Rfl: 4    aspirin 81 MG EC tablet, Take 81 mg by mouth daily, Disp: , Rfl:     glimepiride (AMARYL) 2 MG tablet, TAKE 1 TABLET THREE TIMES A DAY WITH MEALS, Disp: 180 tablet, Rfl: 5    SITagliptin (JANUVIA) 100 MG tablet, TAKE 1 TABLET DAILY, Disp: 90 tablet, Rfl: 3    acyclovir (ZOVIRAX) 5 % ointment, Apply 5 times daily for 4 days on the affected area.  Apply a small amount., Disp: 30 g, Rfl: 1    cyanocobalamin ([employer] VITAMIN B12) 1000 MCG tablet, Take by mouth, Disp: , Rfl:     BIOTIN PO, Take by mouth, Disp: , Rfl:     hydroCHLOROthiazide (HYDRODIURIL) 25 MG tablet, Take 1 tablet by mouth daily As needed, Disp: 90 tablet, Rfl: 3    Compression Stockings MISC, 10-20 mm Hg compression, Disp: , Rfl:     blood glucose test strips (ONE TOUCH TEST STRIPS) strip, Pt test 1x qd dx E11.65, Disp: 100 each, Rfl: 3    Alcohol Swabs (ALCOHOL PREP) 70 % PADS, bid, Disp: 100 each, Rfl: 06    Ascorbic Acid (VITAMIN C) 500 MG tablet, Take 500 mg by mouth 2 times daily. , Disp: , Rfl:     vitamin D (CHOLECALCIFEROL) 1000 UNIT TABS tablet, Take 1,000 Units by mouth daily. , Disp: , Rfl:     ONETOUCH DELICA LANCETS MISC, Test bid, Disp: 100 each, Rfl: 11    vitamin E 100 UNITS capsule, Take 800 Units by mouth daily. , Disp: , Rfl:     Multiple Vitamin (DAILY VITAMIN PO), Take  by mouth.  , Disp: , Rfl:     fish oil-omega-3 fatty acids 1000 MG capsule, Take 1 g by mouth daily. , Disp: , Rfl:   Lab Results   Component Value Date     04/22/2022    K 3.5 04/22/2022     04/22/2022    CO2 23 04/22/2022    BUN 11 04/22/2022    CREATININE 0.74 04/22/2022    GLUCOSE 201 (H) 10/22/2021    CALCIUM 9.4 04/22/2022    PROT 6.8 08/24/2018    LABALBU 4.3 08/24/2018    BILITOT 0.3 08/24/2018    ALKPHOS 55 08/24/2018    AST 21 08/24/2018    ALT 21 08/24/2018    LABGLOM >60.0 04/22/2022    GFRAA >60.0 04/22/2022    GLOB 2.5 08/24/2018     Lab Results   Component Value Date    WBC 4.3 (L) 05/04/2018    HGB 13.1 05/04/2018    HCT 39.2 05/04/2018    MCV 94.1 05/04/2018     05/04/2018     Lab Results   Component Value Date    LABA1C 9.3 (H) 04/22/2022    LABA1C 9.0 (H) 10/22/2021    LABA1C 9.0 (H) 04/23/2021     Lab Results   Component Value Date    CHOLFAST 197 10/22/2021    TRIGLYCFAST 81 10/22/2021    HDL 70 (H) 10/22/2021    HDL 79 (H) 05/08/2020    HDL 69 (H) 05/04/2018    LDLCALC 111 10/22/2021    LDLCALC 104 05/08/2020    LDLCALC 101 05/04/2018    CHOL 194 05/08/2020    CHOL 191 05/04/2018    CHOL 213 (H) 11/03/2017    TRIG 56 05/08/2020    TRIG 106 05/04/2018    TRIG 84 11/03/2017     No results found for: TESTM  Lab Results   Component Value Date    TSH 3.900 11/02/2018    TSH 4.850 (H) 05/04/2018    TSH 4.890 (H) 11/03/2017    T4FREE 1.24 11/02/2018    T4FREE 1.27 05/04/2018    T4FREE 1.06 11/03/2017     No results found for: TPOABS    Review of Systems   Constitutional: Positive for fatigue. Eyes: Negative. Cardiovascular: Negative. Endocrine: Negative. All other systems reviewed and are negative. Objective:   Physical Exam  Vitals reviewed. Constitutional:       Appearance: Normal appearance. HENT:      Head: Normocephalic and atraumatic. Right Ear: External ear normal.      Left Ear: External ear normal.      Nose: Nose normal.   Eyes:      General: No scleral icterus. Right eye: No discharge. Left eye: No discharge. Extraocular Movements: Extraocular movements intact. Conjunctiva/sclera: Conjunctivae normal.   Cardiovascular:      Rate and Rhythm: Normal rate. Pulmonary:      Effort: Pulmonary effort is normal.   Musculoskeletal:         General: Normal range of motion. Cervical back: Normal range of motion and neck supple. Neurological:      General: No focal deficit present. Mental Status: She is alert.    Psychiatric:         Mood and Affect: Mood normal.         Behavior: Behavior normal.

## 2022-05-09 ASSESSMENT — ENCOUNTER SYMPTOMS: EYES NEGATIVE: 1

## 2022-06-16 DIAGNOSIS — E11.65 UNCONTROLLED TYPE 2 DIABETES MELLITUS WITH HYPERGLYCEMIA (HCC): ICD-10-CM

## 2022-06-16 RX ORDER — GLIMEPIRIDE 2 MG/1
TABLET ORAL
Qty: 180 TABLET | Refills: 5 | Status: SHIPPED | OUTPATIENT
Start: 2022-06-16

## 2022-06-16 NOTE — TELEPHONE ENCOUNTER
patient requesting medication refill.  Please approve or deny this request.    Rx requested:  Requested Prescriptions     Pending Prescriptions Disp Refills    glimepiride (AMARYL) 2 MG tablet 180 tablet 5     Sig: TAKE 1 TABLET THREE TIMES A DAY WITH MEALS         Last Office Visit:   5/2/2022      Next Visit Date:  Future Appointments   Date Time Provider Jacques Burgos   8/25/2022  8:45 AM [facility]   11/7/2022  1:00 PM Sourav Rojo  [address]

## 2022-08-25 ENCOUNTER — OFFICE VISIT (OUTPATIENT)
Dept: FAMILY MEDICINE CLINIC | Age: 77
End: 2022-08-25
Payer: MEDICARE

## 2022-08-25 VITALS
SYSTOLIC BLOOD PRESSURE: 134 MMHG | TEMPERATURE: 97.7 F | OXYGEN SATURATION: 98 % | DIASTOLIC BLOOD PRESSURE: 62 MMHG | HEART RATE: 79 BPM | WEIGHT: 118.8 LBS | BODY MASS INDEX: 23.32 KG/M2 | HEIGHT: 60 IN

## 2022-08-25 DIAGNOSIS — H35.30 MACULAR DEGENERATION, UNSPECIFIED LATERALITY, UNSPECIFIED TYPE: ICD-10-CM

## 2022-08-25 DIAGNOSIS — Z86.19 H/O COLD SORES: ICD-10-CM

## 2022-08-25 DIAGNOSIS — I10 ESSENTIAL HYPERTENSION: ICD-10-CM

## 2022-08-25 DIAGNOSIS — E11.65 UNCONTROLLED TYPE 2 DIABETES MELLITUS WITH HYPERGLYCEMIA (HCC): Primary | ICD-10-CM

## 2022-08-25 DIAGNOSIS — E78.2 MIXED HYPERLIPIDEMIA: ICD-10-CM

## 2022-08-25 PROBLEM — I65.21 STENOSIS OF RIGHT CAROTID ARTERY: Status: RESOLVED | Noted: 2017-10-17 | Resolved: 2022-08-25

## 2022-08-25 PROCEDURE — 3046F HEMOGLOBIN A1C LEVEL >9.0%: CPT | Performed by: FAMILY MEDICINE

## 2022-08-25 PROCEDURE — 1123F ACP DISCUSS/DSCN MKR DOCD: CPT | Performed by: FAMILY MEDICINE

## 2022-08-25 PROCEDURE — 99214 OFFICE O/P EST MOD 30 MIN: CPT | Performed by: FAMILY MEDICINE

## 2022-08-25 RX ORDER — LOSARTAN POTASSIUM 100 MG/1
100 TABLET ORAL DAILY
Qty: 90 TABLET | Refills: 3 | Status: SHIPPED | OUTPATIENT
Start: 2022-08-25

## 2022-08-25 RX ORDER — HYDROCHLOROTHIAZIDE 25 MG/1
25 TABLET ORAL DAILY
Qty: 90 TABLET | Refills: 3 | Status: SHIPPED | OUTPATIENT
Start: 2022-08-25

## 2022-08-25 RX ORDER — PRAVASTATIN SODIUM 40 MG
40 TABLET ORAL
Qty: 36 TABLET | Refills: 3 | Status: SHIPPED | OUTPATIENT
Start: 2022-08-26

## 2022-08-25 RX ORDER — AMLODIPINE BESYLATE 2.5 MG/1
TABLET ORAL
Qty: 270 TABLET | Refills: 3 | Status: SHIPPED | OUTPATIENT
Start: 2022-08-25

## 2022-08-25 SDOH — ECONOMIC STABILITY: FOOD INSECURITY: WITHIN THE PAST 12 MONTHS, YOU WORRIED THAT YOUR FOOD WOULD RUN OUT BEFORE YOU GOT MONEY TO BUY MORE.: NEVER TRUE

## 2022-08-25 SDOH — ECONOMIC STABILITY: FOOD INSECURITY: WITHIN THE PAST 12 MONTHS, THE FOOD YOU BOUGHT JUST DIDN'T LAST AND YOU DIDN'T HAVE MONEY TO GET MORE.: NEVER TRUE

## 2022-08-25 ASSESSMENT — SOCIAL DETERMINANTS OF HEALTH (SDOH): HOW HARD IS IT FOR YOU TO PAY FOR THE VERY BASICS LIKE FOOD, HOUSING, MEDICAL CARE, AND HEATING?: NOT HARD AT ALL

## 2022-08-25 NOTE — PROGRESS NOTES
Chief Complaint   Patient presents with    6 Month Follow-Up    Hypertension     Pt has not been checking bp at home. Diabetes     Pt sees Dr. Geronimo Mckeon for diabetic management. HPI: Georges Saver 65 y.o. female presenting for       Diabetes type 2  Patient follows endocrinology. Uncontrolled. Patient is not checking her sugars. Per endocrinology notes patient takes Januvia, Actos, Prandin, and glimepiride. Admits to some hypoglycemic episodes which improved with eating or drinking sugary drinks. Is active in her garden. Recently had her feet checked by her endocrinologist.  Is scheduled to have her eyes checked (no history of diabetic neuropathy or retinopathy). Follow-up  Uncontrolled. Patient does not check her sugar levels. Would like to talk with her endocrinologist to see if she can have a sensor and reader so that she does not have to poke herself as often. Lab Results   Component Value Date    LABA1C 9.3 (H) 04/22/2022     No results found for: EAG        HTN   Stable at this time. Patient take the hCTZ every other day (due to diurectic effect), cozaar 100 mg daily,  Amlodipine 5 mg in the morning and 2.5 mg in the evening. Denies any fevers, chills, nausea, vomiting, chest pain, shortness of breath, abdominal pain, change in urination, or change in stools. BP Readings from Last 20 Encounters:   08/25/22 134/62   05/02/22 (!) 122/55   02/24/22 134/64   11/01/21 136/66   08/24/21 122/62   05/03/21 138/75   02/25/21 138/70   11/17/20 (!) 150/80   08/25/20 120/60   05/19/20 (!) 152/71   02/25/20 136/60   11/19/19 (!) 159/81   08/26/19 124/70   05/14/19 (!) 145/67   02/25/19 (!) 169/72   11/12/18 (!) 160/77   08/24/18 (!) 158/62   05/14/18 (!) 164/76   04/17/18 (!) 170/68   11/14/17 (!) 164/81   ]    Cataracts  Follows up with Ophthalmology   S/p cataract surgery. Healing well. HLD   Patient is taking the pravachol daily.   Patient was on cholesterol medication in the past but was discontinued due to myopathy. Patient now takes the medication M W F. This helps to decrease her myopathy. Lab Results   Component Value Date    CHOL 194 05/08/2020    CHOL 191 05/04/2018    CHOL 213 (H) 11/03/2017     Lab Results   Component Value Date    TRIG 56 05/08/2020    TRIG 106 05/04/2018    TRIG 84 11/03/2017     Lab Results   Component Value Date    HDL 70 (H) 10/22/2021    HDL 79 (H) 05/08/2020    HDL 69 (H) 05/04/2018     Lab Results   Component Value Date    LDLCALC 111 10/22/2021    LDLCALC 104 05/08/2020    LDLCALC 101 05/04/2018     No results found for: LABVLDL, VLDL  Lab Results   Component Value Date    CHOLHDLRATIO 4.0 11/02/2012    CHOLHDLRATIO 4.3 06/08/2012    CHOLHDLRATIO 3.7 12/16/2011         History of cold sores   Recurrent   Occurs on the face   Denies any outbreaks currently   Acyclovir ointment helped in the past with her lesions. Requesting a refill on the medication. Follow  Stable. ? Carotid stenosis   Back in 2017 had an ultrasound with pCp showed carotid stenosis   Went to see a specialist in 2018 and the ultrasound was negative for stenosis   It was recommended for patinet to come back as needed if she was having symptoms. Current Outpatient Medications   Medication Sig Dispense Refill    losartan (COZAAR) 100 MG tablet Take 1 tablet by mouth daily 90 tablet 3    amLODIPine (NORVASC) 2.5 MG tablet Take 2 tablets PO in the morning and 1 tablet PO in the evening.  270 tablet 3    [START ON 8/26/2022] pravastatin (PRAVACHOL) 40 MG tablet Take 1 tablet by mouth three times a week Take 1 tab mon , wed, friday 36 tablet 3    hydroCHLOROthiazide (HYDRODIURIL) 25 MG tablet Take 1 tablet by mouth daily As needed 90 tablet 3    glimepiride (AMARYL) 2 MG tablet TAKE 1 TABLET THREE TIMES A DAY WITH MEALS 180 tablet 5    calcium carbonate (OSCAL) 500 MG TABS tablet Take 500 mg by mouth daily      SITagliptin (JANUVIA) 100 MG tablet TAKE 1 TABLET DAILY 90 tablet 3 repaglinide (PRANDIN) 2 MG tablet TAKE 1 TABLET THREE TIMES A  tablet 4    aspirin 81 MG EC tablet Take 81 mg by mouth daily      acyclovir (ZOVIRAX) 5 % ointment Apply 5 times daily for 4 days on the affected area. Apply a small amount. 30 g 1    cyanocobalamin 1000 MCG tablet Take by mouth      BIOTIN PO Take by mouth      Compression Stockings MISC 10-20 mm Hg compression      blood glucose test strips (ONE TOUCH TEST STRIPS) strip Pt test 1x qd dx E11.65 100 each 3    Alcohol Swabs (ALCOHOL PREP) 70 % PADS bid 100 each 06    Ascorbic Acid (VITAMIN C) 500 MG tablet Take 500 mg by mouth 2 times daily. vitamin D (CHOLECALCIFEROL) 1000 UNIT TABS tablet Take 1,000 Units by mouth daily. ONETOUCH DELICA LANCETS MISC Test bid 100 each 11    vitamin E 100 UNITS capsule Take 800 Units by mouth daily. Multiple Vitamin (DAILY VITAMIN PO) Take  by mouth. fish oil-omega-3 fatty acids 1000 MG capsule Take 1 g by mouth daily. No current facility-administered medications for this visit. ROS  CONSTITUTIONAL: The patient denies fevers, chills, sweats and body ache. HEENT: Denies headache, blurry vision, eye pain, tinnitus, vertigo,  sore throat, neck or thyroid masses. RESPIRATORY: Denies cough, sputum, hemoptysis. CARDIAC: Admits to a twinge in her chest, denies pressure, palpitations, Denies lower extremity edema. GASTROINTESTINAL: Denies abdominal pain, constipation, diarrhea, bleeding in the stools,   GENITOURINARY: Denies dysuria, hematuria, nocturia or frequency, urinary incontinence. NEUROLOGIC: Denies headaches, dizziness, syncope, weakness  MUSCULOSKELETAL: denies changes in range of motion, joint pain, stiffness. ENDOCRINOLOGY: Denies heat or cold intolerance. HEMATOLOGY: Denies easy bleeding or blood transfusion,anemia  DERMATOLOGY: Denies changes in moles or pigmentation changes. Admits to a history of cold sores.    PSYCHIATRY: Denies depression, agitation or anxiety.     Past Medical History:   Diagnosis Date    Anemia     Fatigue     Fibroids     Hyperlipidemia     Hyperthyroidism     Palpitations     Sinusitis     Type II or unspecified type diabetes mellitus without mention of complication, not stated as uncontrolled         Past Surgical History:   Procedure Laterality Date    CATARACT REMOVAL Left     CATARACT REMOVAL Right     CYST REMOVAL Right [address]        Family History   Problem Relation Age of Onset    Cancer Other     Diabetes Other     Breast Cancer Maternal Aunt     Breast Cancer Maternal Aunt         Social History     Socioeconomic History    Marital status:      Spouse name: Not on file    Number of children: Not on file    Years of education: Not on file    Highest education level: Not on file   Occupational History    Not on file   Tobacco Use    Smoking status: Never    Smokeless tobacco: Never   Substance and Sexual Activity    Alcohol use: Not on file    Drug use: Not on file    Sexual activity: Not on file   Other Topics Concern    Not on file   Social History Narrative    Not on file     Social Determinants of Health     Financial Resource Strain: Low Risk     Difficulty of Paying Living Expenses: Not hard at all   Food Insecurity: No Food Insecurity    Worried About Running Out of Food in the Last Year: Never true    [address] in the Last Year: Never true   Transportation Needs: Not on file   Physical Activity: Insufficiently Active    Days of Exercise per Week: 1 day    Minutes of Exercise per Session: 120 min   Stress: Not on file   Social Connections: Not on file   Intimate Partner Violence: Not on file   Housing Stability: Not on file        /62   Pulse 79   Temp 97.7 °F (36.5 °C) (Temporal)   Ht 5' (1.524 m)   Wt 118 lb 12.8 oz (53.9 kg)   LMP  (LMP Unknown)   SpO2 98%   BMI 23.20 kg/m²        Physical Exam:    General appearance - alert, well appearing, and in no distress  Mental Status - alert, oriented to person, place, and time  Eyes - pupils equal and reactive, extraocular eye movements intact   Ears - bilateral TM's and external ear canals normal   Nose - normal and patent, no erythema, discharge or polyps   Sinuses - Normal paranasal sinuses without tenderness   Throat - mucous membranes moist, pharynx normal without lesions   Neck - supple, no significant adenopathy   Thyroid - thyroid is normal in size without nodules or tenderness    Chest - clear to auscultation, no wheezes, rales or rhonchi, symmetric air entry   Heart - normal rate, regular rhythm, normal S1, S2, no murmurs, rubs, clicks or gallops  Abdomen - soft, nontender, nondistended, no masses or organomegaly   Back exam - full range of motion, no tenderness, palpable spasm or pain on motion   Neurological - alert, oriented, normal speech, no focal findings or movement disorder noted   Musculoskeletal - no joint tenderness, deformity or swelling   Extremities - peripheral pulses normal, no pedal edema, no clubbing or cyanosis   Skin - normal coloration and turgor, no rashes, no suspicious skin lesions noted. Yellow hypertrophic toenails.     Labs   No results found for: TSHREFLEX  TSH   Date Value Ref Range Status   11/02/2018 3.900 0.270 - 4.200 uIU/mL Final   05/04/2018 4.850 (H) 0.270 - 4.200 uIU/mL Final   11/03/2017 4.890 (H) 0.270 - 4.200 uIU/mL Final   05/05/2017 4.450 (H) 0.270 - 4.200 uIU/mL Final   09/26/2013 3.265 0.550 - 4.780 uIU/mL Final     Lab Results   Component Value Date     04/22/2022    K 3.5 04/22/2022     04/22/2022    CO2 23 04/22/2022    BUN 11 04/22/2022    CREATININE 0.74 04/22/2022    GLUCOSE 201 (H) 10/22/2021    CALCIUM 9.4 04/22/2022    PROT 6.8 08/24/2018    LABALBU 4.3 08/24/2018    BILITOT 0.3 08/24/2018    ALKPHOS 55 08/24/2018    AST 21 08/24/2018    ALT 21 08/24/2018    LABGLOM >60.0 04/22/2022    GFRAA >60.0 04/22/2022    GLOB 2.5 08/24/2018 Lab Results   Component Value Date    WBC 4.3 (L) 05/04/2018    HGB 13.1 05/04/2018    HCT 39.2 05/04/2018    MCV 94.1 05/04/2018     05/04/2018     Lab Results   Component Value Date    LABA1C 9.3 (H) 04/22/2022     No results found for: EAG      A/P: Anju Hernandez 65 y.o. female presenting for     1. Essential hypertension    - losartan (COZAAR) 100 MG tablet; Take 1 tablet by mouth daily  Dispense: 90 tablet; Refill: 3  - amLODIPine (NORVASC) 2.5 MG tablet; Take 2 tablets PO in the morning and 1 tablet PO in the evening. Dispense: 270 tablet; Refill: 3  - hydroCHLOROthiazide (HYDRODIURIL) 25 MG tablet; Take 1 tablet by mouth daily As needed  Dispense: 90 tablet; Refill: 3    2. Mixed hyperlipidemia    - pravastatin (PRAVACHOL) 40 MG tablet; Take 1 tablet by mouth three times a week Take 1 tab mon , wed, friday  Dispense: 36 tablet; Refill: 3  - Lipid, Fasting; Future  - Hepatic Function Panel; Future    3. Uncontrolled type 2 diabetes mellitus with hyperglycemia ([address]  F/u with endocrinology     4. H/O cold sores      5. Macular degeneration, unspecified laterality, unspecified type  F/u with ophthalmology   S/p cataract surgery.

## 2022-09-06 ENCOUNTER — TELEPHONE (OUTPATIENT)
Dept: FAMILY MEDICINE CLINIC | Age: 77
End: 2022-09-06

## 2022-09-06 NOTE — TELEPHONE ENCOUNTER
Pt's  called stating that Dr Jamarcus Noriega requested pt to have her carotid artery checked again. Pt's  stated they called the [facility] who stated it could be done there. Pt's  stated to send order to [facility]. Fax [phone].

## 2022-09-07 ENCOUNTER — TELEPHONE (OUTPATIENT)
Dept: FAMILY MEDICINE CLINIC | Age: 77
End: 2022-09-07

## 2022-09-07 DIAGNOSIS — I65.29 STENOSIS OF CAROTID ARTERY, UNSPECIFIED LATERALITY: Primary | ICD-10-CM

## 2022-09-07 DIAGNOSIS — I79.8 OTHER DISORDERS OF ARTERIES, ARTERIOLES AND CAPILLARIES IN DISEASES CLASSIFIED ELSEWHERE (HCC): ICD-10-CM

## 2022-09-07 NOTE — TELEPHONE ENCOUNTER
----- Message from [facility] sent at 9/6/2022 10:13 AM EDT -----  Subject: Message to Provider    QUESTIONS  Information for Provider? Patient needs request for Carotid Studies to be   fax over to [facility] needs to state [facility] fax# [phone]  ---------------------------------------------------------------------------  --------------  [address] [facility]  [phone]; OK to leave message on voicemail  ---------------------------------------------------------------------------  --------------  SCRIPT ANSWERS  Relationship to Patient?  Self

## 2022-10-28 DIAGNOSIS — E78.2 MIXED HYPERLIPIDEMIA: ICD-10-CM

## 2022-10-28 DIAGNOSIS — E11.65 UNCONTROLLED TYPE 2 DIABETES MELLITUS WITH HYPERGLYCEMIA (HCC): ICD-10-CM

## 2022-10-28 LAB
ALBUMIN SERPL-MCNC: 4.3 G/DL (ref 3.5–4.6)
ALP BLD-CCNC: 62 U/L (ref 40–130)
ALT SERPL-CCNC: 23 U/L (ref 0–33)
ANION GAP SERPL CALCULATED.3IONS-SCNC: 9 MEQ/L (ref 9–15)
AST SERPL-CCNC: 21 U/L (ref 0–35)
BILIRUB SERPL-MCNC: 0.5 MG/DL (ref 0.2–0.7)
BILIRUBIN DIRECT: <0.2 MG/DL (ref 0–0.4)
BILIRUBIN, INDIRECT: NORMAL MG/DL (ref 0–0.6)
BUN BLDV-MCNC: 11 MG/DL (ref 8–23)
CALCIUM SERPL-MCNC: 9.3 MG/DL (ref 8.5–9.9)
CHLORIDE BLD-SCNC: 104 MEQ/L (ref 95–107)
CHOLESTEROL, FASTING: 204 MG/DL (ref 0–199)
CO2: 29 MEQ/L (ref 20–31)
CREAT SERPL-MCNC: 0.79 MG/DL (ref 0.5–0.9)
CREATININE URINE: 62.3 MG/DL
GFR SERPL CREATININE-BSD FRML MDRD: >60 ML/MIN/{1.73_M2}
GLUCOSE FASTING: 218 MG/DL (ref 70–99)
HBA1C MFR BLD: 9.4 % (ref 4.8–5.9)
HDLC SERPL-MCNC: 71 MG/DL (ref 40–59)
LDL CHOLESTEROL CALCULATED: 108 MG/DL (ref 0–129)
MICROALBUMIN UR-MCNC: 2.3 MG/DL
MICROALBUMIN/CREAT UR-RTO: 36.9 MG/G (ref 0–30)
POTASSIUM SERPL-SCNC: 3.8 MEQ/L (ref 3.4–4.9)
SODIUM BLD-SCNC: 142 MEQ/L (ref 135–144)
TOTAL PROTEIN: 7 G/DL (ref 6.3–8)
TRIGLYCERIDE, FASTING: 126 MG/DL (ref 0–150)

## 2022-11-07 ENCOUNTER — OFFICE VISIT (OUTPATIENT)
Dept: ENDOCRINOLOGY | Age: 77
End: 2022-11-07
Payer: MEDICARE

## 2022-11-07 VITALS
SYSTOLIC BLOOD PRESSURE: 136 MMHG | WEIGHT: 120 LBS | OXYGEN SATURATION: 98 % | DIASTOLIC BLOOD PRESSURE: 56 MMHG | HEIGHT: 60 IN | HEART RATE: 70 BPM | BODY MASS INDEX: 23.56 KG/M2

## 2022-11-07 DIAGNOSIS — E11.65 UNCONTROLLED TYPE 2 DIABETES MELLITUS WITH HYPERGLYCEMIA (HCC): Primary | ICD-10-CM

## 2022-11-07 PROCEDURE — 99213 OFFICE O/P EST LOW 20 MIN: CPT | Performed by: INTERNAL MEDICINE

## 2022-11-07 PROCEDURE — 3078F DIAST BP <80 MM HG: CPT | Performed by: INTERNAL MEDICINE

## 2022-11-07 PROCEDURE — 3046F HEMOGLOBIN A1C LEVEL >9.0%: CPT | Performed by: INTERNAL MEDICINE

## 2022-11-07 PROCEDURE — 1123F ACP DISCUSS/DSCN MKR DOCD: CPT | Performed by: INTERNAL MEDICINE

## 2022-11-07 PROCEDURE — 3074F SYST BP LT 130 MM HG: CPT | Performed by: INTERNAL MEDICINE

## 2022-11-07 NOTE — PROGRESS NOTES
11/7/2022    Assessment:       Diagnosis Orders   1. Uncontrolled type 2 diabetes mellitus with hyperglycemia ([facility]              PLAN:     Orders Placed This Encounter   Procedures    Basic Metabolic Panel     Standing Status:   Future     Standing Expiration Date:   11/7/2023    Hemoglobin A1C     Standing Status:   Future     Standing Expiration Date:   11/7/2023     Continue current dose of Januvia 100 mg daily glimepiride 2 mg twice a day and Prandin 2 mg 3 times a day patient reluctant to go on any other injectable A1c goal of 8 or lower    Subjective:     Chief Complaint   Patient presents with    Diabetes     Vitals:    11/07/22 1314   BP: (!) 136/56   Pulse: 70   SpO2: 98%   Weight: 120 lb (54.4 kg)   Height: 5' (1.524 m)     Wt Readings from Last 3 Encounters:   11/07/22 120 lb (54.4 kg)   08/25/22 118 lb 12.8 oz (53.9 kg)   05/02/22 122 lb (55.3 kg)     BP Readings from Last 3 Encounters:   11/07/22 (!) 136/56   08/25/22 134/62   05/02/22 (!) 122/55     Follow-up on type 2 diabetes patient on 3 oral agents blood sugar still continues to run high not testing regularly  Follow-up blood sugars close to 220 hemoglobin A1c was 9.4    Diabetes  She presents for her follow-up diabetic visit. She has type 2 diabetes mellitus. There are no hypoglycemic associated symptoms. Pertinent negatives for diabetes include no polyuria, no visual change and no weight loss. There are no hypoglycemic complications. Current diabetic treatment includes oral agent (triple therapy). Her overall blood glucose range is >200 mg/dl.  (Hemoglobin A1C       Date                     Value               Ref Range           Status                10/28/2022               9.4 (H)             4.8 - 5.9 %         Final            ----------  )   Past Medical History:   Diagnosis Date    Anemia     Fatigue     Fibroids     Hyperlipidemia     Hyperthyroidism     Palpitations     Sinusitis     Type II or unspecified type diabetes mellitus without mention of complication, not stated as uncontrolled      Past Surgical History:   Procedure Laterality Date    CATARACT REMOVAL Left     CATARACT REMOVAL Right     CYST REMOVAL Right 1966    HYSTERECTOMY (CERVIX STATUS UNKNOWN)  1996    OVARY REMOVAL      TONSILLECTOMY  1949     Social History     Socioeconomic History    Marital status:      Spouse name: Not on file    Number of children: Not on file    Years of education: Not on file    Highest education level: Not on file   Occupational History    Not on file   Tobacco Use    Smoking status: Never    Smokeless tobacco: Never   Substance and Sexual Activity    Alcohol use: Not on file    Drug use: Not on file    Sexual activity: Not on file   Other Topics Concern    Not on file   Social History Narrative    Not on file     Social Determinants of Health     Financial Resource Strain: Low Risk     Difficulty of Paying Living Expenses: Not hard at all   Food Insecurity: No Food Insecurity    Worried About Running Out of Food in the Last Year: Never true    Ran Out of Food in the Last Year: Never true   Transportation Needs: Not on file   Physical Activity: Insufficiently Active    Days of Exercise per Week: 1 day    Minutes of Exercise per Session: 120 min   Stress: Not on file   Social Connections: Not on file   Intimate Partner Violence: Not on file   Housing Stability: Not on file     Family History   Problem Relation Age of Onset    Cancer Other     Diabetes Other     Breast Cancer Maternal Aunt     Breast Cancer Maternal Aunt      Allergies   Allergen Reactions    Adhesive Tape      Other reaction(s):  Other: See Comments  Peels skin off     Avapro [Irbesartan]     Eggs [Egg White]     Lipitor     Morphine     Pcn [Penicillins]     Statins      High dose of anything--->achy  Other reaction(s): Myalgia  High dose of anything--->achy       Current Outpatient Medications:     losartan (COZAAR) 100 MG tablet, Take 1 tablet by mouth daily, Disp: 90 tablet, Rfl: 3    amLODIPine (NORVASC) 2.5 MG tablet, Take 2 tablets PO in the morning and 1 tablet PO in the evening., Disp: 270 tablet, Rfl: 3    pravastatin (PRAVACHOL) 40 MG tablet, Take 1 tablet by mouth three times a week Take 1 tab mon , wed, friday, Disp: 36 tablet, Rfl: 3    hydroCHLOROthiazide (HYDRODIURIL) 25 MG tablet, Take 1 tablet by mouth daily As needed, Disp: 90 tablet, Rfl: 3    glimepiride (AMARYL) 2 MG tablet, TAKE 1 TABLET THREE TIMES A DAY WITH MEALS, Disp: 180 tablet, Rfl: 5    calcium carbonate (OSCAL) 500 MG TABS tablet, Take 500 mg by mouth daily, Disp: , Rfl:     SITagliptin (JANUVIA) 100 MG tablet, TAKE 1 TABLET DAILY, Disp: 90 tablet, Rfl: 3    repaglinide (PRANDIN) 2 MG tablet, TAKE 1 TABLET THREE TIMES A DAY, Disp: 270 tablet, Rfl: 4    aspirin 81 MG EC tablet, Take 81 mg by mouth daily, Disp: , Rfl:     acyclovir (ZOVIRAX) 5 % ointment, Apply 5 times daily for 4 days on the affected area. Apply a small amount., Disp: 30 g, Rfl: 1    cyanocobalamin 1000 MCG tablet, Take by mouth, Disp: , Rfl:     BIOTIN PO, Take by mouth, Disp: , Rfl:     Compression Stockings MISC, 10-20 mm Hg compression, Disp: , Rfl:     blood glucose test strips (ONE TOUCH TEST STRIPS) strip, Pt test 1x qd dx E11.65, Disp: 100 each, Rfl: 3    Alcohol Swabs (ALCOHOL PREP) 70 % PADS, bid, Disp: 100 each, Rfl: 06    Ascorbic Acid (VITAMIN C) 500 MG tablet, Take 500 mg by mouth 2 times daily. , Disp: , Rfl:     vitamin D (CHOLECALCIFEROL) 1000 UNIT TABS tablet, Take 1,000 Units by mouth daily. , Disp: , Rfl:     ONETOUCH DELICA LANCETS MISC, Test bid, Disp: 100 each, Rfl: 11    vitamin E 100 UNITS capsule, Take 800 Units by mouth daily. , Disp: , Rfl:     Multiple Vitamin (DAILY VITAMIN PO), Take  by mouth.  , Disp: , Rfl:     fish oil-omega-3 fatty acids 1000 MG capsule, Take 1 g by mouth daily. , Disp: , Rfl:   Lab Results   Component Value Date     10/28/2022    K 3.8 10/28/2022     10/28/2022    CO2 29 10/28/2022    BUN 11 10/28/2022    CREATININE 0.79 10/28/2022    GLUCOSE 201 (H) 10/22/2021    CALCIUM 9.3 10/28/2022    PROT 7.0 10/28/2022    LABALBU 4.3 10/28/2022    BILITOT 0.5 10/28/2022    ALKPHOS 62 10/28/2022    AST 21 10/28/2022    ALT 23 10/28/2022    LABGLOM >60.0 10/28/2022    GFRAA >60.0 04/22/2022    GLOB 2.5 08/24/2018     Lab Results   Component Value Date    WBC 4.3 (L) 05/04/2018    HGB 13.1 05/04/2018    HCT 39.2 05/04/2018    MCV 94.1 05/04/2018     05/04/2018     Lab Results   Component Value Date    LABA1C 9.4 (H) 10/28/2022    LABA1C 9.3 (H) 04/22/2022    LABA1C 9.0 (H) 10/22/2021     Lab Results   Component Value Date    CHOLFAST 204 (H) 10/28/2022    CHOLFAST 197 10/22/2021    TRIGLYCFAST 126 10/28/2022    TRIGLYCFAST 81 10/22/2021    HDL 71 (H) 10/28/2022    HDL 70 (H) 10/22/2021    HDL 79 (H) 05/08/2020    LDLCALC 108 10/28/2022    LDLCALC 111 10/22/2021    LDLCALC 104 05/08/2020    CHOL 194 05/08/2020    CHOL 191 05/04/2018    CHOL 213 (H) 11/03/2017    TRIG 56 05/08/2020    TRIG 106 05/04/2018    TRIG 84 11/03/2017     No results found for: TESTM  Lab Results   Component Value Date    TSH 3.900 11/02/2018    TSH 4.850 (H) 05/04/2018    TSH 4.890 (H) 11/03/2017    T4FREE 1.24 11/02/2018    T4FREE 1.27 05/04/2018    T4FREE 1.06 11/03/2017     No results found for: TPOABS    Review of Systems   Constitutional:  Negative for weight loss. Eyes: Negative. Cardiovascular: Negative. Endocrine: Negative for polyuria. Neurological: Negative. All other systems reviewed and are negative. Objective:   Physical Exam  Vitals reviewed. Constitutional:       General: She is not in acute distress. Appearance: Normal appearance. HENT:      Head: Normocephalic and atraumatic. Right Ear: External ear normal.      Left Ear: External ear normal.      Nose: Nose normal.   Eyes:      General: No scleral icterus. Right eye: No discharge. Left eye: No discharge.       Extraocular Movements: Extraocular movements intact. Conjunctiva/sclera: Conjunctivae normal.   Cardiovascular:      Rate and Rhythm: Normal rate. Pulmonary:      Effort: Pulmonary effort is normal.   Musculoskeletal:         General: Normal range of motion. Cervical back: Normal range of motion and neck supple. Neurological:      General: No focal deficit present. Mental Status: She is alert and oriented to person, place, and time.    Psychiatric:         Mood and Affect: Mood normal.         Behavior: Behavior normal.

## 2022-11-10 ENCOUNTER — TELEPHONE (OUTPATIENT)
Dept: FAMILY MEDICINE CLINIC | Age: 77
End: 2022-11-10

## 2022-11-10 DIAGNOSIS — I65.29 STENOSIS OF CAROTID ARTERY, UNSPECIFIED LATERALITY: Primary | ICD-10-CM

## 2022-11-10 NOTE — TELEPHONE ENCOUNTER
Patient called requesting a referral to Dr. Mario Kellogg, vascular surgeon, at the [facility]. She is asking for her [address] [place] [address] medical record # [id number] to be referenced on the cover sheet. Please fax to [phone]. Thank you.

## 2022-11-16 ASSESSMENT — ENCOUNTER SYMPTOMS
VISUAL CHANGE: 0
EYES NEGATIVE: 1

## 2023-01-17 RX ORDER — REPAGLINIDE 2 MG/1
TABLET ORAL
Qty: 270 TABLET | Refills: 4 | Status: SHIPPED | OUTPATIENT
Start: 2023-01-17

## 2023-01-17 NOTE — TELEPHONE ENCOUNTER
Patient requesting medication refill.  Please approve or deny this request.    Rx requested:  Requested Prescriptions     Pending Prescriptions Disp Refills    repaglinide (PRANDIN) 2 MG tablet 270 tablet 4     Sig: TAKE 1 TABLET THREE TIMES A DAY         Last Office Visit:   11/7/2022      Next Visit Date:  Future Appointments   Date Time Provider Jacques Burgos   2/24/2023 10:00 AM Jan Shay MD [facility]   5/15/2023  1:15 PM Tara England MD [facility]

## 2023-02-24 ENCOUNTER — OFFICE VISIT (OUTPATIENT)
Dept: FAMILY MEDICINE CLINIC | Age: 78
End: 2023-02-24
Payer: MEDICARE

## 2023-02-24 VITALS
HEART RATE: 83 BPM | TEMPERATURE: 97.2 F | HEIGHT: 60 IN | OXYGEN SATURATION: 98 % | DIASTOLIC BLOOD PRESSURE: 64 MMHG | BODY MASS INDEX: 23.56 KG/M2 | SYSTOLIC BLOOD PRESSURE: 138 MMHG | WEIGHT: 120 LBS

## 2023-02-24 DIAGNOSIS — Z12.31 ENCOUNTER FOR SCREENING MAMMOGRAM FOR MALIGNANT NEOPLASM OF BREAST: Primary | ICD-10-CM

## 2023-02-24 DIAGNOSIS — H35.30 MACULAR DEGENERATION, UNSPECIFIED LATERALITY, UNSPECIFIED TYPE: ICD-10-CM

## 2023-02-24 DIAGNOSIS — I10 ESSENTIAL HYPERTENSION: ICD-10-CM

## 2023-02-24 DIAGNOSIS — E11.65 UNCONTROLLED TYPE 2 DIABETES MELLITUS WITH HYPERGLYCEMIA (HCC): ICD-10-CM

## 2023-02-24 DIAGNOSIS — R30.0 DYSURIA: ICD-10-CM

## 2023-02-24 DIAGNOSIS — I65.21 STENOSIS OF RIGHT CAROTID ARTERY: ICD-10-CM

## 2023-02-24 DIAGNOSIS — E55.9 VITAMIN D DEFICIENCY: ICD-10-CM

## 2023-02-24 DIAGNOSIS — E78.2 MIXED HYPERLIPIDEMIA: ICD-10-CM

## 2023-02-24 PROCEDURE — 1123F ACP DISCUSS/DSCN MKR DOCD: CPT | Performed by: FAMILY MEDICINE

## 2023-02-24 PROCEDURE — 3078F DIAST BP <80 MM HG: CPT | Performed by: FAMILY MEDICINE

## 2023-02-24 PROCEDURE — 3075F SYST BP GE 130 - 139MM HG: CPT | Performed by: FAMILY MEDICINE

## 2023-02-24 PROCEDURE — 99214 OFFICE O/P EST MOD 30 MIN: CPT | Performed by: FAMILY MEDICINE

## 2023-02-24 SDOH — ECONOMIC STABILITY: FOOD INSECURITY: WITHIN THE PAST 12 MONTHS, THE FOOD YOU BOUGHT JUST DIDN'T LAST AND YOU DIDN'T HAVE MONEY TO GET MORE.: NEVER TRUE

## 2023-02-24 SDOH — ECONOMIC STABILITY: HOUSING INSECURITY
IN THE LAST 12 MONTHS, WAS THERE A TIME WHEN YOU DID NOT HAVE A STEADY PLACE TO SLEEP OR SLEPT IN A SHELTER (INCLUDING NOW)?: NO

## 2023-02-24 SDOH — ECONOMIC STABILITY: FOOD INSECURITY: WITHIN THE PAST 12 MONTHS, YOU WORRIED THAT YOUR FOOD WOULD RUN OUT BEFORE YOU GOT MONEY TO BUY MORE.: NEVER TRUE

## 2023-02-24 SDOH — ECONOMIC STABILITY: INCOME INSECURITY: HOW HARD IS IT FOR YOU TO PAY FOR THE VERY BASICS LIKE FOOD, HOUSING, MEDICAL CARE, AND HEATING?: NOT HARD AT ALL

## 2023-02-24 ASSESSMENT — LIFESTYLE VARIABLES
HOW OFTEN DO YOU HAVE A DRINK CONTAINING ALCOHOL: NEVER
HOW MANY STANDARD DRINKS CONTAINING ALCOHOL DO YOU HAVE ON A TYPICAL DAY: PATIENT DOES NOT DRINK

## 2023-02-24 ASSESSMENT — PATIENT HEALTH QUESTIONNAIRE - PHQ9
SUM OF ALL RESPONSES TO PHQ9 QUESTIONS 1 & 2: 0
SUM OF ALL RESPONSES TO PHQ QUESTIONS 1-9: 0
SUM OF ALL RESPONSES TO PHQ QUESTIONS 1-9: 0
1. LITTLE INTEREST OR PLEASURE IN DOING THINGS: 0
2. FEELING DOWN, DEPRESSED OR HOPELESS: 0
SUM OF ALL RESPONSES TO PHQ QUESTIONS 1-9: 0
SUM OF ALL RESPONSES TO PHQ QUESTIONS 1-9: 0

## 2023-02-24 NOTE — PROGRESS NOTES
Chief Complaint   Patient presents with    6 Carlos Otero for diabetes    Hypertension     Does not usually check bp. HPI: Roscoe Labrador 65 y.o. female presenting for     Shingles    Had it on the left side of side of the face   Happened amount ago   Painful itchy and had vesicles   Went away on its own  Had it in the past - the antiviaral was not covered at that time. Does not want shingles vaccine (weary of the taking the vaccine). Diabetes type 2  Patient follows endocrinology. Uncontrolled. Patient is not checking her sugars. Per endocrinology notes patient takes Januvia, Actos, Prandin, and glimepiride. Admits to some hypoglycemic episodes which improved with eating or drinking sugary drinks. Is active in her garden. Recently had her feet checked by her endocrinologist.  Is scheduled to have her eyes checked (no history of diabetic neuropathy or retinopathy). Follow-up  Uncontrolled. Patient does not check her sugar levels. Would like to talk with her endocrinologist to see if she can have a sensor and reader so that she does not have to poke herself as often. Hemoglobin A1C   Date Value Ref Range Status   10/28/2022 9.4 (H) 4.8 - 5.9 % Final     Pounding in the ears   More frequent   More on the left than the right   Had the hearing aids checked recently   Sounds like a motor running or a heart beat   Wants to hold off on ENT    Cramaping   Feels it in the back of the legs   Spasms   Admits to some ramping in the fingers       HTN   Stable at this time. Patient take the hCTZ every other day (due to diurectic effect), cozaar 100 mg daily,  Amlodipine 5 mg in the morning and 2.5 mg in the evening. Denies any fevers, chills, nausea, vomiting, chest pain, shortness of breath, abdominal pain, change in urination, or change in stools.   BP Readings from Last 20 Encounters:   02/24/23 138/64   11/07/22 (!) 136/56   08/25/22 134/62   05/02/22 (!) 122/55 02/24/22 134/64   11/01/21 136/66   08/24/21 122/62   05/03/21 138/75   02/25/21 138/70   11/17/20 (!) 150/80   08/25/20 120/60   05/19/20 (!) 152/71   02/25/20 136/60   11/19/19 (!) 159/81   08/26/19 124/70   05/14/19 (!) 145/67   02/25/19 (!) 169/72   11/12/18 (!) 160/77   08/24/18 (!) 158/62   05/14/18 (!) 164/76   ]    Cataracts  Follows up with Ophthalmology   S/p cataract surgery. Healing well. HLD   Patient is taking the pravachol daily. Patient was on cholesterol medication in the past but was discontinued due to myopathy. Patient now takes the medication M W F. This helps to decrease her myopathy. Lab Results   Component Value Date    CHOL 194 05/08/2020    CHOL 191 05/04/2018    CHOL 213 (H) 11/03/2017     Lab Results   Component Value Date    TRIG 56 05/08/2020    TRIG 106 05/04/2018    TRIG 84 11/03/2017     Lab Results   Component Value Date    HDL 71 (H) 10/28/2022    HDL 70 (H) 10/22/2021    HDL 79 (H) 05/08/2020     Lab Results   Component Value Date    LDLCALC 108 10/28/2022    LDLCALC 111 10/22/2021    LDLCALC 104 05/08/2020     No results found for: LABVLDL, VLDL  Lab Results   Component Value Date    CHOLHDLRATIO 4.0 11/02/2012    CHOLHDLRATIO 4.3 06/08/2012    CHOLHDLRATIO 3.7 12/16/2011         History of cold sores   Recurrent   Occurs on the face   Denies any outbreaks currently   Acyclovir ointment helped in the past with her lesions. Requesting a refill on the medication. Follow  Stable. ? Carotid stenosis   Back in 2017 had an ultrasound with pCp showed carotid stenosis   Went to see a specialist in 2018 and the ultrasound was negative for stenosis   It was recommended for patinet to come back as needed if she was having symptoms. F/u   Did have the carotid ultrasound     LEFT SIDE     Internal carotid artery: 20-39% stenosis. Tortuous vessel from mid to distal .     Vertebral artery: Patent and antegrade flow noted. Subclavian artery: Patent. Technologist: Stephenie Rivas RVMEREDITH   Referring physician: Siomara Cohen MD     Interpreting physician: Niya Scott MD, [address]     Electronically signed by Niya Scott MD, [address] on 10/6/2022 at 9:35:38 AM    Current Outpatient Medications   Medication Sig Dispense Refill    repaglinide (PRANDIN) 2 MG tablet TAKE 1 TABLET THREE TIMES A  tablet 4    losartan (COZAAR) 100 MG tablet Take 1 tablet by mouth daily 90 tablet 3    amLODIPine (NORVASC) 2.5 MG tablet Take 2 tablets PO in the morning and 1 tablet PO in the evening. 270 tablet 3    pravastatin (PRAVACHOL) 40 MG tablet Take 1 tablet by mouth three times a week Take 1 tab mon , wed, friday 36 tablet 3    hydroCHLOROthiazide (HYDRODIURIL) 25 MG tablet Take 1 tablet by mouth daily As needed 90 tablet 3    glimepiride (AMARYL) 2 MG tablet TAKE 1 TABLET THREE TIMES A DAY WITH MEALS 180 tablet 5    calcium carbonate (OSCAL) 500 MG TABS tablet Take 500 mg by mouth daily      SITagliptin (JANUVIA) 100 MG tablet TAKE 1 TABLET DAILY 90 tablet 3    aspirin 81 MG EC tablet Take 81 mg by mouth daily      acyclovir (ZOVIRAX) 5 % ointment Apply 5 times daily for 4 days on the affected area. Apply a small amount. 30 g 1    cyanocobalamin 1000 MCG tablet Take by mouth      BIOTIN PO Take by mouth      Compression Stockings MISC 10-20 mm Hg compression      blood glucose test strips (ONE TOUCH TEST STRIPS) strip Pt test 1x qd dx E11.65 100 each 3    Alcohol Swabs (ALCOHOL PREP) 70 % PADS bid 100 each 06    Ascorbic Acid (VITAMIN C) 500 MG tablet Take 500 mg by mouth 2 times daily. vitamin D (CHOLECALCIFEROL) 1000 UNIT TABS tablet Take 1,000 Units by mouth daily. ONETOUCH DELICA LANCETS MISC Test bid 100 each 11    vitamin E 100 UNITS capsule Take 800 Units by mouth daily. Multiple Vitamin (DAILY VITAMIN PO) Take  by mouth. fish oil-omega-3 fatty acids 1000 MG capsule Take 1 g by mouth daily.        No current facility-administered medications for this visit.        ROS  CONSTITUTIONAL: The patient denies fevers, chills, sweats and body ache. HEENT: Denies headache, blurry vision, eye pain, tinnitus, vertigo,  sore throat, neck or thyroid masses. RESPIRATORY: Denies cough, sputum, hemoptysis. CARDIAC: Admits to a twinge in her chest, denies pressure, palpitations, Denies lower extremity edema. GASTROINTESTINAL: Denies abdominal pain, constipation, diarrhea, bleeding in the stools,   GENITOURINARY: admits to dysuria, denies hematuria, nocturia or frequency, urinary incontinence. NEUROLOGIC: Denies headaches, dizziness, syncope, weakness  MUSCULOSKELETAL: denies changes in range of motion, joint pain, stiffness. ENDOCRINOLOGY: Denies heat or cold intolerance. HEMATOLOGY: Denies easy bleeding or blood transfusion,anemia  DERMATOLOGY: Denies changes in moles or pigmentation changes. Admits to a history of cold sores. PSYCHIATRY: Denies depression, agitation or anxiety.     Past Medical History:   Diagnosis Date    Anemia     Fatigue     Fibroids     Hyperlipidemia     Hyperthyroidism     Palpitations     Sinusitis     Type II or unspecified type diabetes mellitus without mention of complication, not stated as uncontrolled         Past Surgical History:   Procedure Laterality Date    CATARACT REMOVAL Left     CATARACT REMOVAL Right     CYST REMOVAL Right 1966    HYSTERECTOMY (CERVIX STATUS UNKNOWN)  [address]        Family History   Problem Relation Age of Onset    Cancer Other     Diabetes Other     Breast Cancer Maternal Aunt     Breast Cancer Maternal Aunt         Social History     Socioeconomic History    Marital status:      Spouse name: Not on file    Number of children: Not on file    Years of education: Not on file    Highest education level: Not on file   Occupational History    Not on file   Tobacco Use    Smoking status: Never    Smokeless tobacco: Never   Substance and Sexual Activity    Alcohol use: Not on file    Drug use: Not on file    Sexual activity: Not on file   Other Topics Concern    Not on file   Social History Narrative    Not on file     Social Determinants of Health     Financial Resource Strain: Low Risk     Difficulty of Paying Living Expenses: Not hard at all   Food Insecurity: No Food Insecurity    Worried About [address] in the Last Year: Never true    [address] in the Last Year: Never true   Transportation Needs: Unknown    Lack of Transportation (Medical): Not on file    Lack of Transportation (Non-Medical):  No   Physical Activity: Insufficiently Active    Days of Exercise per Week: 1 day    Minutes of Exercise per Session: 120 min   Stress: Not on file   Social Connections: Not on file   Intimate Partner Violence: Not on file   Housing Stability: Unknown    Unable to Pay for Housing in the Last Year: Not on file    Number of Places Lived in the Last Year: Not on file    Unstable Housing in the Last Year: No        /64   Pulse 83   Temp 97.2 °F (36.2 °C) (Temporal)   Ht 5' (1.524 m)   Wt 120 lb (54.4 kg)   LMP  (LMP Unknown)   SpO2 98%   BMI 23.44 kg/m²        Physical Exam:    General appearance - alert, well appearing, and in no distress  Mental Status - alert, oriented to person, place, and time  Eyes - pupils equal and reactive, extraocular eye movements intact   Ears - bilateral TM's and external ear canals normal   Nose - normal and patent, no erythema, discharge or polyps   Sinuses - Normal paranasal sinuses without tenderness   Throat - mucous membranes moist, pharynx normal without lesions   Neck - supple, no significant adenopathy   Thyroid - thyroid is normal in size without nodules or tenderness    Chest - clear to auscultation, no wheezes, rales or rhonchi, symmetric air entry   Heart - normal rate, regular rhythm, normal S1, S2, no murmurs, rubs, clicks or gallops  Abdomen - soft, nontender, nondistended, no masses or organomegaly   Back exam - full range of motion, no tenderness, palpable spasm or pain on motion   Neurological - alert, oriented, normal speech, no focal findings or movement disorder noted   Musculoskeletal - no joint tenderness, deformity or swelling   Extremities - peripheral pulses normal, no pedal edema, no clubbing or cyanosis   Skin - normal coloration and turgor, no rashes, no suspicious skin lesions noted. Yellow hypertrophic toenails. Labs   No results found for: TSHREFLEX  TSH   Date Value Ref Range Status   11/02/2018 3.900 0.270 - 4.200 uIU/mL Final   05/04/2018 4.850 (H) 0.270 - 4.200 uIU/mL Final   11/03/2017 4.890 (H) 0.270 - 4.200 uIU/mL Final   05/05/2017 4.450 (H) 0.270 - 4.200 uIU/mL Final   09/26/2013 3.265 0.550 - 4.780 uIU/mL Final     Lab Results   Component Value Date     10/28/2022    K 3.8 10/28/2022     10/28/2022    CO2 29 10/28/2022    BUN 11 10/28/2022    CREATININE 0.79 10/28/2022    GLUCOSE 201 (H) 10/22/2021    CALCIUM 9.3 10/28/2022    PROT 7.0 10/28/2022    LABALBU 4.3 10/28/2022    BILITOT 0.5 10/28/2022    ALKPHOS 62 10/28/2022    AST 21 10/28/2022    ALT 23 10/28/2022    LABGLOM >60.0 10/28/2022    GFRAA >60.0 04/22/2022    GLOB 2.5 08/24/2018       Lab Results   Component Value Date    WBC 4.3 (L) 05/04/2018    HGB 13.1 05/04/2018    HCT 39.2 05/04/2018    MCV 94.1 05/04/2018     05/04/2018     Lab Results   Component Value Date    LABA1C 9.4 (H) 10/28/2022     No results found for: EAG      A/P: Silva Quarleser 65 y.o. female presenting for     1. Uncontrolled type 2 diabetes mellitus with hyperglycemia (HCC)  Hemoglobin A1C   Date Value Ref Range Status   10/28/2022 9.4 (H) 4.8 - 5.9 % Final     Managed by endocrinology     2. Encounter for screening mammogram for malignant neoplasm of breast    - [facility] DIGITAL SCREEN W OR WO CAD BILATERAL; Future    3.  Essential hypertension  BP Readings from Last 20 Encounters:   02/24/23 138/64   11/07/22 (!) 136/56   08/25/22 134/62   05/02/22 (!) 122/55   02/24/22 134/64   11/01/21 136/66   08/24/21 122/62   05/03/21 138/75   02/25/21 138/70   11/17/20 (!) 150/80   08/25/20 120/60   05/19/20 (!) 152/71   02/25/20 136/60   11/19/19 (!) 159/81   08/26/19 124/70   05/14/19 (!) 145/67   02/25/19 (!) 169/72   11/12/18 (!) 160/77   08/24/18 (!) 158/62   05/14/18 (!) 164/76   ]adhere to low sodium diet     4. Mixed hyperlipidemia  Lab Results   Component Value Date    CHOL 194 05/08/2020    CHOL 191 05/04/2018    CHOL 213 (H) 11/03/2017     Lab Results   Component Value Date    TRIG 56 05/08/2020    TRIG 106 05/04/2018    TRIG 84 11/03/2017     Lab Results   Component Value Date    HDL 71 (H) 10/28/2022    HDL 70 (H) 10/22/2021    HDL 79 (H) 05/08/2020     Lab Results   Component Value Date    LDLCALC 108 10/28/2022    LDLCALC 111 10/22/2021    LDLCALC 104 05/08/2020     No results found for: LABVLDL, VLDL  Lab Results   Component Value Date    CHOLHDLRATIO 4.0 11/02/2012    CHOLHDLRATIO 4.3 06/08/2012    CHOLHDLRATIO 3.7 12/16/2011         5. Vitamin D deficiency      6. Macular degeneration, unspecified laterality, unspecified type      7. Stenosis of right carotid artery  Small amount of plaque in the arteries. Continue to monitor     8. Dysuria  Will notify patinet of the results. - Culture, Urine;  Future

## 2023-02-26 DIAGNOSIS — R30.0 DYSURIA: ICD-10-CM

## 2023-02-27 LAB — URINE CULTURE, ROUTINE: NORMAL

## 2023-03-13 ENCOUNTER — HOSPITAL ENCOUNTER (OUTPATIENT)
Dept: WOMENS IMAGING | Age: 78
Discharge: HOME | End: 2023-03-15
Payer: MEDICARE

## 2023-03-13 DIAGNOSIS — Z12.31 ENCOUNTER FOR SCREENING MAMMOGRAM FOR MALIGNANT NEOPLASM OF BREAST: ICD-10-CM

## 2023-03-13 PROCEDURE — 77063 BREAST TOMOSYNTHESIS BI: CPT

## 2023-05-05 DIAGNOSIS — E11.65 UNCONTROLLED TYPE 2 DIABETES MELLITUS WITH HYPERGLYCEMIA (HCC): ICD-10-CM

## 2023-05-05 LAB
ANION GAP SERPL CALCULATED.3IONS-SCNC: 11 MEQ/L (ref 9–15)
BUN SERPL-MCNC: 14 MG/DL (ref 8–23)
CALCIUM SERPL-MCNC: 9.2 MG/DL (ref 8.5–9.9)
CHLORIDE SERPL-SCNC: 104 MEQ/L (ref 95–107)
CO2 SERPL-SCNC: 28 MEQ/L (ref 20–31)
CREAT SERPL-MCNC: 0.75 MG/DL (ref 0.5–0.9)
GLUCOSE SERPL-MCNC: 219 MG/DL (ref 70–99)
HBA1C MFR BLD: 10.2 % (ref 4.8–5.9)
POTASSIUM SERPL-SCNC: 3.8 MEQ/L (ref 3.4–4.9)
SODIUM SERPL-SCNC: 143 MEQ/L (ref 135–144)

## 2023-05-15 ENCOUNTER — OFFICE VISIT (OUTPATIENT)
Dept: ENDOCRINOLOGY | Age: 78
End: 2023-05-15
Payer: MEDICARE

## 2023-05-15 VITALS
HEIGHT: 60 IN | BODY MASS INDEX: 23.36 KG/M2 | DIASTOLIC BLOOD PRESSURE: 63 MMHG | HEART RATE: 79 BPM | SYSTOLIC BLOOD PRESSURE: 146 MMHG | OXYGEN SATURATION: 98 % | WEIGHT: 119 LBS

## 2023-05-15 DIAGNOSIS — E11.65 UNCONTROLLED TYPE 2 DIABETES MELLITUS WITH HYPERGLYCEMIA (HCC): Primary | ICD-10-CM

## 2023-05-15 PROCEDURE — 3046F HEMOGLOBIN A1C LEVEL >9.0%: CPT | Performed by: INTERNAL MEDICINE

## 2023-05-15 PROCEDURE — 1123F ACP DISCUSS/DSCN MKR DOCD: CPT | Performed by: INTERNAL MEDICINE

## 2023-05-15 PROCEDURE — 3077F SYST BP >= 140 MM HG: CPT | Performed by: INTERNAL MEDICINE

## 2023-05-15 PROCEDURE — 3078F DIAST BP <80 MM HG: CPT | Performed by: INTERNAL MEDICINE

## 2023-05-15 PROCEDURE — 99213 OFFICE O/P EST LOW 20 MIN: CPT | Performed by: INTERNAL MEDICINE

## 2023-05-15 RX ORDER — GLIMEPIRIDE 2 MG/1
TABLET ORAL
Qty: 270 TABLET | Refills: 5 | Status: SHIPPED | OUTPATIENT
Start: 2023-05-15

## 2023-05-15 ASSESSMENT — ENCOUNTER SYMPTOMS
EYES NEGATIVE: 1
VISUAL CHANGE: 0

## 2023-05-15 NOTE — PROGRESS NOTES
5/15/2023    Assessment:       Diagnosis Orders   1. Uncontrolled type 2 diabetes mellitus with hyperglycemia (HCC)              PLAN:     Continue current dose of glimepiride Januvia and Prandin patient to follow-up in 6 months A1c goal of 8 or lower  Orders Placed This Encounter   Procedures    Hemoglobin A1C     Standing Status:   Future     Standing Expiration Date:   3/39/0083    Basic Metabolic Panel     Standing Status:   Future     Standing Expiration Date:   5/15/2024     Orders Placed This Encounter   Medications    SITagliptin (JANUVIA) 100 MG tablet     Sig: TAKE 1 TABLET DAILY     Dispense:  90 tablet     Refill:  3    glimepiride (AMARYL) 2 MG tablet     Sig: TAKE 1 TABLET THREE TIMES A DAY WITH MEALS     Dispense:  270 tablet     Refill:  5       Subjective:     Chief Complaint   Patient presents with    Diabetes     Vitals:    05/15/23 1311 05/15/23 1315   BP: (!) 146/62 (!) 146/63   Site: Right Upper Arm    Position: Sitting    Cuff Size: Medium Adult    Pulse: 79    SpO2: 98%    Weight: 119 lb (54 kg)    Height: 5' (1.524 m)      Wt Readings from Last 3 Encounters:   05/15/23 119 lb (54 kg)   02/24/23 120 lb (54.4 kg)   11/07/22 120 lb (54.4 kg)     BP Readings from Last 3 Encounters:   05/15/23 (!) 146/63   02/24/23 138/64   11/07/22 (!) 136/56     Follow-up on type 2 diabetes blood sugars have been overall high. Patient not testing her sugars A1c was 10.2 currently on glimepiride 3 times daily Januvia once a day takes Prandin only once a day has had increased stress due to family reasons chemistries were stable denies any issues with vision    Diabetes  She presents for her follow-up diabetic visit. She has type 2 diabetes mellitus. Her disease course has been fluctuating. Pertinent negatives for diabetes include no polydipsia, no polyuria and no visual change. Symptoms are worsening. Risk factors for coronary artery disease include stress.  Current diabetic treatment includes oral agent (triple

## 2023-07-11 ENCOUNTER — OFFICE VISIT (OUTPATIENT)
Dept: FAMILY MEDICINE CLINIC | Age: 78
End: 2023-07-11
Payer: MEDICARE

## 2023-07-11 VITALS
HEART RATE: 80 BPM | BODY MASS INDEX: 22.97 KG/M2 | HEIGHT: 60 IN | DIASTOLIC BLOOD PRESSURE: 68 MMHG | TEMPERATURE: 97.2 F | OXYGEN SATURATION: 97 % | WEIGHT: 117 LBS | SYSTOLIC BLOOD PRESSURE: 138 MMHG

## 2023-07-11 DIAGNOSIS — E78.2 MIXED HYPERLIPIDEMIA: ICD-10-CM

## 2023-07-11 DIAGNOSIS — E11.65 UNCONTROLLED TYPE 2 DIABETES MELLITUS WITH HYPERGLYCEMIA (HCC): Primary | ICD-10-CM

## 2023-07-11 DIAGNOSIS — H35.30 MACULAR DEGENERATION, UNSPECIFIED LATERALITY, UNSPECIFIED TYPE: ICD-10-CM

## 2023-07-11 DIAGNOSIS — Z00.00 MEDICARE ANNUAL WELLNESS VISIT, SUBSEQUENT: Primary | ICD-10-CM

## 2023-07-11 DIAGNOSIS — I10 ESSENTIAL HYPERTENSION: ICD-10-CM

## 2023-07-11 DIAGNOSIS — E55.9 VITAMIN D DEFICIENCY: ICD-10-CM

## 2023-07-11 DIAGNOSIS — Z86.19 H/O COLD SORES: ICD-10-CM

## 2023-07-11 PROCEDURE — 1123F ACP DISCUSS/DSCN MKR DOCD: CPT | Performed by: FAMILY MEDICINE

## 2023-07-11 PROCEDURE — 3046F HEMOGLOBIN A1C LEVEL >9.0%: CPT | Performed by: FAMILY MEDICINE

## 2023-07-11 PROCEDURE — 3078F DIAST BP <80 MM HG: CPT | Performed by: FAMILY MEDICINE

## 2023-07-11 PROCEDURE — 3075F SYST BP GE 130 - 139MM HG: CPT | Performed by: FAMILY MEDICINE

## 2023-07-11 PROCEDURE — 99214 OFFICE O/P EST MOD 30 MIN: CPT | Performed by: FAMILY MEDICINE

## 2023-07-11 PROCEDURE — G0439 PPPS, SUBSEQ VISIT: HCPCS | Performed by: FAMILY MEDICINE

## 2023-07-11 RX ORDER — HYDROCHLOROTHIAZIDE 25 MG/1
25 TABLET ORAL DAILY
Qty: 90 TABLET | Refills: 3 | Status: SHIPPED | OUTPATIENT
Start: 2023-07-11

## 2023-07-11 RX ORDER — PRAVASTATIN SODIUM 40 MG
40 TABLET ORAL
Qty: 36 TABLET | Refills: 3 | Status: SHIPPED | OUTPATIENT
Start: 2023-07-12

## 2023-07-11 RX ORDER — AMLODIPINE BESYLATE 2.5 MG/1
TABLET ORAL
Qty: 270 TABLET | Refills: 3 | Status: SHIPPED | OUTPATIENT
Start: 2023-07-11

## 2023-07-11 RX ORDER — LOSARTAN POTASSIUM 100 MG/1
100 TABLET ORAL DAILY
Qty: 90 TABLET | Refills: 3 | Status: SHIPPED | OUTPATIENT
Start: 2023-07-11

## 2023-07-11 NOTE — PROGRESS NOTES
her chest, denies pressure, palpitations, Denies lower extremity edema. GASTROINTESTINAL: Denies abdominal pain, constipation, diarrhea, bleeding in the stools,   GENITOURINARY: admits to dysuria, denies hematuria, nocturia or frequency, urinary incontinence. NEUROLOGIC: Denies headaches, dizziness, syncope, weakness  MUSCULOSKELETAL: history of arthritis in the hands. ENDOCRINOLOGY: Denies heat or cold intolerance. HEMATOLOGY: Denies easy bleeding or blood transfusion,anemia  DERMATOLOGY: Denies changes in moles or pigmentation changes. Admits to a history of cold sores. PSYCHIATRY: Denies depression, agitation or anxiety.     Past Medical History:   Diagnosis Date    Anemia     Fatigue     Fibroids     Hyperlipidemia     Hyperthyroidism     Palpitations     Sinusitis     Type II or unspecified type diabetes mellitus without mention of complication, not stated as uncontrolled         Past Surgical History:   Procedure Laterality Date    CATARACT REMOVAL Left     CATARACT REMOVAL Right     CYST REMOVAL Right 1966    HYSTERECTOMY (CERVIX STATUS UNKNOWN)  [address] [place] [address]        Family History   Problem Relation Age of Onset    Breast Cancer Maternal Aunt     Breast Cancer Maternal Aunt     Cancer Other     Diabetes Other     Breast Cancer Maternal Cousin         Social History     Socioeconomic History    Marital status:      Spouse name: Not on file    Number of children: Not on file    Years of education: Not on file    Highest education level: Not on file   Occupational History    Not on file   Tobacco Use    Smoking status: Never    Smokeless tobacco: Never   Substance and Sexual Activity    Alcohol use: Not on file    Drug use: Not on file    Sexual activity: Not on file   Other Topics Concern    Not on file   Social History Narrative    Not on file     Social Determinants of Health     Financial Resource Strain: Low Risk     Difficulty of Paying Living Expenses: Not

## 2023-07-11 NOTE — PROGRESS NOTES
Medicare Annual Wellness Visit    Diane Ng is here for Medicare AWV (awv)    Assessment & Plan   Medicare annual wellness visit, subsequent  Recommendations for Preventive Services Due: see orders and patient instructions/AVS.  Recommended screening schedule for the next 1 years is provided to the patient in written form: see Patient Instructions/AVS. 20 minutes spent on the encounter      No follow-ups on file. Subjective       Patient's complete Health Risk Assessment and screening values have been reviewed and are found in Flowsheets. The following problems were reviewed today and where indicated follow up appointments were made and/or referrals ordered. Positive Risk Factor Screenings with Interventions:                                       Objective   There were no vitals filed for this visit. There is no height or weight on file to calculate BMI. Allergies   Allergen Reactions    Adhesive Tape      Other reaction(s): Other: See Comments  Peels skin off     Avapro [Irbesartan]     Eggs [Egg White]     Lipitor     Morphine     Pcn [Penicillins]     Statins      High dose of anything--->achy  Other reaction(s): Myalgia  High dose of anything--->achy     Prior to Visit Medications    Medication Sig Taking? Authorizing Provider   losartan (COZAAR) 100 MG tablet Take 1 tablet by mouth daily  Jose Rafael Aldana MD   amLODIPine (NORVASC) 2.5 MG tablet Take 2 tablets PO in the morning and 1 tablet PO in the evening.   Paula Gray MD   pravastatin (PRAVACHOL) 40 MG tablet Take 1 tablet by mouth three times a week Take 1 tab mon , wed, Dmitri Sanchez MD   hydroCHLOROthiazide (HYDRODIURIL) 25 MG tablet Take 1 tablet by mouth daily As needed  Paula Gray MD   SITagliptin (JANUVIA) 100 MG tablet TAKE 1 TABLET DAILY  Myranda Nuñez MD   glimepiride (AMARYL) 2 MG tablet TAKE 1 TABLET THREE TIMES A DAY WITH MEALS  Wesley Velez MD   repaglinide (PRANDIN) 2 MG tablet TAKE 1 patient, resident

## 2023-09-15 RX ORDER — REPAGLINIDE 2 MG/1
TABLET ORAL
Qty: 90 TABLET | Refills: 4 | Status: SHIPPED | OUTPATIENT
Start: 2023-09-15

## 2023-09-15 NOTE — TELEPHONE ENCOUNTER
Requested Prescriptions     Pending Prescriptions Disp Refills    repaglinide (PRANDIN) 2 MG tablet 90 tablet 4     Sig: TAKE 1 TABLET DAILY     She couldn't tolerate three times daily so she cut it down to 1 daily.     F/u appt 11/20/23

## 2023-09-18 ENCOUNTER — TELEPHONE (OUTPATIENT)
Dept: ENDOCRINOLOGY | Age: 78
End: 2023-09-18

## 2023-09-18 RX ORDER — REPAGLINIDE 2 MG/1
TABLET ORAL
Qty: 270 TABLET | Refills: 4 | Status: SHIPPED | OUTPATIENT
Start: 2023-09-18

## 2023-09-18 NOTE — TELEPHONE ENCOUNTER
[facility] calling you had sent in a script for prandin on the 15th with the directions one daily the patient has always taken 3 daily the pharmacy would like to verify this change.   Please call [phone] REF # [id number]

## 2023-11-10 DIAGNOSIS — I10 ESSENTIAL HYPERTENSION: ICD-10-CM

## 2023-11-10 DIAGNOSIS — E11.65 UNCONTROLLED TYPE 2 DIABETES MELLITUS WITH HYPERGLYCEMIA (HCC): ICD-10-CM

## 2023-11-10 DIAGNOSIS — E78.2 MIXED HYPERLIPIDEMIA: ICD-10-CM

## 2023-11-10 LAB
ANION GAP SERPL CALCULATED.3IONS-SCNC: 9 MEQ/L (ref 9–15)
BUN SERPL-MCNC: 14 MG/DL (ref 8–23)
CALCIUM SERPL-MCNC: 9.4 MG/DL (ref 8.5–9.9)
CHLORIDE SERPL-SCNC: 103 MEQ/L (ref 95–107)
CHOLEST SERPL-MCNC: 185 MG/DL (ref 0–199)
CO2 SERPL-SCNC: 30 MEQ/L (ref 20–31)
CREAT SERPL-MCNC: 0.8 MG/DL (ref 0.5–0.9)
GLUCOSE SERPL-MCNC: 201 MG/DL (ref 70–99)
HBA1C MFR BLD: 10.1 % (ref 4.8–5.9)
HDLC SERPL-MCNC: 66 MG/DL (ref 40–59)
LDL CHOLESTEROL CALCULATED: 96 MG/DL (ref 0–129)
POTASSIUM SERPL-SCNC: 4 MEQ/L (ref 3.4–4.9)
SODIUM SERPL-SCNC: 142 MEQ/L (ref 135–144)
TRIGLYCERIDE, FASTING: 113 MG/DL (ref 0–150)

## 2023-11-20 ENCOUNTER — OFFICE VISIT (OUTPATIENT)
Dept: ENDOCRINOLOGY | Age: 78
End: 2023-11-20
Payer: MEDICARE

## 2023-11-20 VITALS
OXYGEN SATURATION: 98 % | SYSTOLIC BLOOD PRESSURE: 135 MMHG | HEIGHT: 60 IN | BODY MASS INDEX: 22.97 KG/M2 | WEIGHT: 117 LBS | DIASTOLIC BLOOD PRESSURE: 73 MMHG | HEART RATE: 73 BPM

## 2023-11-20 DIAGNOSIS — E11.65 UNCONTROLLED TYPE 2 DIABETES MELLITUS WITH HYPERGLYCEMIA (HCC): Primary | ICD-10-CM

## 2023-11-20 PROCEDURE — 3078F DIAST BP <80 MM HG: CPT | Performed by: INTERNAL MEDICINE

## 2023-11-20 PROCEDURE — 3046F HEMOGLOBIN A1C LEVEL >9.0%: CPT | Performed by: INTERNAL MEDICINE

## 2023-11-20 PROCEDURE — 3075F SYST BP GE 130 - 139MM HG: CPT | Performed by: INTERNAL MEDICINE

## 2023-11-20 PROCEDURE — 99213 OFFICE O/P EST LOW 20 MIN: CPT | Performed by: INTERNAL MEDICINE

## 2023-11-20 PROCEDURE — 1123F ACP DISCUSS/DSCN MKR DOCD: CPT | Performed by: INTERNAL MEDICINE

## 2023-11-20 RX ORDER — GLIMEPIRIDE 2 MG/1
TABLET ORAL
Qty: 270 TABLET | Refills: 5 | Status: SHIPPED | OUTPATIENT
Start: 2023-11-20

## 2023-11-20 NOTE — PROGRESS NOTES
11/20/2023    Assessment:       Diagnosis Orders   1. Uncontrolled type 2 diabetes mellitus with hyperglycemia (HCC)              PLAN:   Continue metformin patient for diabetes patient to follow-up in 6 months  Orders Placed This Encounter   Procedures    Hemoglobin A1C     Standing Status:   Future     Standing Expiration Date:   45/15/8413    Basic Metabolic Panel     Standing Status:   Future     Standing Expiration Date:   11/20/2024         Subjective:     Chief Complaint   Patient presents with    Diabetes     Vitals:    11/20/23 1304   BP: 135/73   Pulse: 73   SpO2: 98%   Weight: 53.1 kg (117 lb)   Height: 1.524 m (5')     Wt Readings from Last 3 Encounters:   11/20/23 53.1 kg (117 lb)   07/11/23 53.1 kg (117 lb)   05/15/23 54 kg (119 lb)     BP Readings from Last 3 Encounters:   11/20/23 135/73   07/11/23 138/68   05/15/23 (!) 146/63     Follow-up type 2 diabetes patient on 3 oral medications hemoglobin A1c was more than 10 blood sugars close to 250% mild medical proteinuria lipid panel has been stable  Hemoglobin A1C       Date                     Value               Ref Range           Status                11/10/2023               10.1 (H)            4.8 - 5.9 %         Final            ----------  Patient on Januvia 100 mg daily glimepiride 2 mg 3 times daily and Prandin 2 mg 3 times daily    Diabetes  She presents for her follow-up diabetic visit. She has type 2 diabetes mellitus. Pertinent negatives for diabetes include no polydipsia and no polyuria. Symptoms are stable. Diabetic complications include nephropathy. Current diabetic treatment includes oral agent (triple therapy). Her overall blood glucose range is >200 mg/dl. An ACE inhibitor/angiotensin II receptor blocker is being taken.      Past Medical History:   Diagnosis Date    Anemia     Fatigue     Fibroids     Hyperlipidemia     Hyperthyroidism     Palpitations     Sinusitis     Type II or unspecified type diabetes mellitus without mention of

## 2024-01-09 ENCOUNTER — OFFICE VISIT (OUTPATIENT)
Dept: FAMILY MEDICINE CLINIC | Age: 79
End: 2024-01-09
Payer: MEDICARE

## 2024-01-09 VITALS
BODY MASS INDEX: 22.58 KG/M2 | SYSTOLIC BLOOD PRESSURE: 134 MMHG | OXYGEN SATURATION: 98 % | TEMPERATURE: 97.5 F | WEIGHT: 115 LBS | DIASTOLIC BLOOD PRESSURE: 64 MMHG | HEIGHT: 60 IN | HEART RATE: 88 BPM

## 2024-01-09 DIAGNOSIS — J01.00 ACUTE MAXILLARY SINUSITIS, RECURRENCE NOT SPECIFIED: ICD-10-CM

## 2024-01-09 DIAGNOSIS — I65.21 STENOSIS OF RIGHT CAROTID ARTERY: ICD-10-CM

## 2024-01-09 DIAGNOSIS — L65.9 HAIR THINNING: ICD-10-CM

## 2024-01-09 DIAGNOSIS — H35.30 MACULAR DEGENERATION, UNSPECIFIED LATERALITY, UNSPECIFIED TYPE: ICD-10-CM

## 2024-01-09 DIAGNOSIS — Z12.31 ENCOUNTER FOR SCREENING MAMMOGRAM FOR MALIGNANT NEOPLASM OF BREAST: ICD-10-CM

## 2024-01-09 DIAGNOSIS — Z86.19 H/O COLD SORES: ICD-10-CM

## 2024-01-09 DIAGNOSIS — E11.3213 TYPE 2 DIABETES MELLITUS WITH BOTH EYES AFFECTED BY MILD NONPROLIFERATIVE RETINOPATHY AND MACULAR EDEMA, WITHOUT LONG-TERM CURRENT USE OF INSULIN (HCC): ICD-10-CM

## 2024-01-09 DIAGNOSIS — E55.9 VITAMIN D DEFICIENCY: ICD-10-CM

## 2024-01-09 DIAGNOSIS — E78.2 MIXED HYPERLIPIDEMIA: ICD-10-CM

## 2024-01-09 DIAGNOSIS — I10 ESSENTIAL HYPERTENSION: Primary | ICD-10-CM

## 2024-01-09 DIAGNOSIS — R10.9 ABDOMINAL PAIN, UNSPECIFIED ABDOMINAL LOCATION: ICD-10-CM

## 2024-01-09 DIAGNOSIS — L60.3 NAIL BRITTLENESS: ICD-10-CM

## 2024-01-09 PROCEDURE — 3078F DIAST BP <80 MM HG: CPT | Performed by: FAMILY MEDICINE

## 2024-01-09 PROCEDURE — 99214 OFFICE O/P EST MOD 30 MIN: CPT | Performed by: FAMILY MEDICINE

## 2024-01-09 PROCEDURE — 1123F ACP DISCUSS/DSCN MKR DOCD: CPT | Performed by: FAMILY MEDICINE

## 2024-01-09 PROCEDURE — 3075F SYST BP GE 130 - 139MM HG: CPT | Performed by: FAMILY MEDICINE

## 2024-01-09 ASSESSMENT — PATIENT HEALTH QUESTIONNAIRE - PHQ9
SUM OF ALL RESPONSES TO PHQ QUESTIONS 1-9: 0
1. LITTLE INTEREST OR PLEASURE IN DOING THINGS: 0
SUM OF ALL RESPONSES TO PHQ QUESTIONS 1-9: 0
2. FEELING DOWN, DEPRESSED OR HOPELESS: 0
SUM OF ALL RESPONSES TO PHQ QUESTIONS 1-9: 0
SUM OF ALL RESPONSES TO PHQ9 QUESTIONS 1 & 2: 0
SUM OF ALL RESPONSES TO PHQ QUESTIONS 1-9: 0

## 2024-01-09 NOTE — PROGRESS NOTES
05/02/22 (!) 122/55   02/24/22 134/64   11/01/21 136/66   08/24/21 122/62   05/03/21 138/75   02/25/21 138/70   11/17/20 (!) 150/80   08/25/20 120/60   05/19/20 (!) 152/71   02/25/20 136/60   11/19/19 (!) 159/81   08/26/19 124/70   05/14/19 (!) 145/67   ]    Cataracts  Follows up with Ophthalmology   S/p cataract surgery.     HLD   Patient is taking the pravachol daily.  Patient was on cholesterol medication in the past but was discontinued due to myopathy.  Patient now takes the medication M W F.  This helps to decrease her myopathy.  Lab Results   Component Value Date    CHOL 194 05/08/2020    CHOL 191 05/04/2018    CHOL 213 (H) 11/03/2017     Lab Results   Component Value Date    TRIG 56 05/08/2020    TRIG 106 05/04/2018    TRIG 84 11/03/2017     Lab Results   Component Value Date    HDL 66 (H) 11/10/2023    HDL 71 (H) 10/28/2022    HDL 70 (H) 10/22/2021     Lab Results   Component Value Date    LDLCALC 96 11/10/2023    LDLCALC 108 10/28/2022    LDLCALC 111 10/22/2021     No results found for: \"LABVLDL\", \"VLDL\"  Lab Results   Component Value Date    CHOLHDLRATIO 4.0 11/02/2012    CHOLHDLRATIO 4.3 06/08/2012    CHOLHDLRATIO 3.7 12/16/2011         History of cold sores   Recurrent   Occurs on the face   Denies any outbreaks currently   Acyclovir ointment helped in the past with her lesions.   Requesting a refill on the medication.     Follow  Stable.      ? Carotid stenosis   Back in 2017 had an ultrasound with pCp showed carotid stenosis   Went to see a specialist in 2018 and the ultrasound was negative for stenosis   It was recommended for patinet to come back as needed if she was having symptoms.     F/u   Did have the carotid ultrasound     LEFT SIDE     Internal carotid artery: 20-39% stenosis.   Tortuous vessel from mid to distal .     Vertebral artery: Patent and antegrade flow noted.     Subclavian artery: Patent.     Technologist: Paula Radford RVT   Referring physician: Jose Rafael Aldana MD

## 2024-03-12 ENCOUNTER — OFFICE VISIT (OUTPATIENT)
Dept: FAMILY MEDICINE CLINIC | Age: 79
End: 2024-03-12
Payer: MEDICARE

## 2024-03-12 VITALS
HEIGHT: 60 IN | HEART RATE: 85 BPM | SYSTOLIC BLOOD PRESSURE: 154 MMHG | TEMPERATURE: 98 F | DIASTOLIC BLOOD PRESSURE: 58 MMHG | WEIGHT: 112.2 LBS | OXYGEN SATURATION: 98 % | BODY MASS INDEX: 22.03 KG/M2

## 2024-03-12 DIAGNOSIS — M54.6 CHRONIC MIDLINE THORACIC BACK PAIN: Primary | ICD-10-CM

## 2024-03-12 DIAGNOSIS — R07.81 RIB PAIN ON LEFT SIDE: ICD-10-CM

## 2024-03-12 DIAGNOSIS — G89.29 CHRONIC MIDLINE THORACIC BACK PAIN: Primary | ICD-10-CM

## 2024-03-12 LAB
BILIRUBIN, POC: NORMAL
BLOOD URINE, POC: NORMAL
CLARITY, POC: CLEAR
COLOR, POC: YELLOW
GLUCOSE URINE, POC: NORMAL
KETONES, POC: NORMAL
LEUKOCYTE EST, POC: NORMAL
NITRITE, POC: NORMAL
PH, POC: 6
PROTEIN, POC: NORMAL
SPECIFIC GRAVITY, POC: 1.01
UROBILINOGEN, POC: NORMAL

## 2024-03-12 PROCEDURE — 99213 OFFICE O/P EST LOW 20 MIN: CPT | Performed by: FAMILY MEDICINE

## 2024-03-12 PROCEDURE — 1123F ACP DISCUSS/DSCN MKR DOCD: CPT | Performed by: FAMILY MEDICINE

## 2024-03-12 PROCEDURE — 3077F SYST BP >= 140 MM HG: CPT | Performed by: FAMILY MEDICINE

## 2024-03-12 PROCEDURE — 81003 URINALYSIS AUTO W/O SCOPE: CPT | Performed by: FAMILY MEDICINE

## 2024-03-12 PROCEDURE — 3078F DIAST BP <80 MM HG: CPT | Performed by: FAMILY MEDICINE

## 2024-03-12 SDOH — ECONOMIC STABILITY: FOOD INSECURITY: WITHIN THE PAST 12 MONTHS, YOU WORRIED THAT YOUR FOOD WOULD RUN OUT BEFORE YOU GOT MONEY TO BUY MORE.: NEVER TRUE

## 2024-03-12 SDOH — ECONOMIC STABILITY: FOOD INSECURITY: WITHIN THE PAST 12 MONTHS, THE FOOD YOU BOUGHT JUST DIDN'T LAST AND YOU DIDN'T HAVE MONEY TO GET MORE.: NEVER TRUE

## 2024-03-12 SDOH — ECONOMIC STABILITY: INCOME INSECURITY: HOW HARD IS IT FOR YOU TO PAY FOR THE VERY BASICS LIKE FOOD, HOUSING, MEDICAL CARE, AND HEATING?: NOT HARD AT ALL

## 2024-03-12 ASSESSMENT — ENCOUNTER SYMPTOMS: ABDOMINAL PAIN: 1

## 2024-03-12 NOTE — PROGRESS NOTES
Subjective  Gabbie Barrios 1945 is a 78 y.o. female who presents today with:  Chief Complaint   Patient presents with    Abdominal Pain     C/O pain in her left side for the past month. Reports she was outside shoveling snow prior to the onset of the pain. States she had a lot of swelling from her left flank area radiating into her abdomen, but this has since subsided. She also had pain with coughing or sneezing, but this has resolved as well. Denies fevers, chills, night sweats, dysuria, nausea, vomiting, or diarrhea. After the injury, she was constipated for a few days, but her bowels are now regular. She has used Tylenol for relief PRN.        Abdominal Pain      I have reviewed HPI and agree with above. Pain starts in midline below bra line.  Radiates down and around to rib cartilage.  Was swollen but does seem to be getting better.    Review of Systems   Gastrointestinal:  Positive for abdominal pain.   All other systems reviewed and are negative.      Past Medical History:   Diagnosis Date    Anemia     Fatigue     Fibroids     Hearing loss     Hyperlipidemia     Hypertension     Hyperthyroidism     Palpitations     Sinusitis     Type 2 diabetes mellitus without complication (HCC)     Type II or unspecified type diabetes mellitus without mention of complication, not stated as uncontrolled      Past Surgical History:   Procedure Laterality Date    CATARACT REMOVAL Left     CATARACT REMOVAL Right     CYST REMOVAL Right 1966    EYE SURGERY      HYSTERECTOMY (CERVIX STATUS UNKNOWN)  1996    HYSTERECTOMY, TOTAL ABDOMINAL (CERVIX REMOVED)      OVARY REMOVAL      TONSILLECTOMY  1949     Social History     Socioeconomic History    Marital status:      Spouse name: Not on file    Number of children: Not on file    Years of education: Not on file    Highest education level: Not on file   Occupational History    Not on file   Tobacco Use    Smoking status: Never    Smokeless tobacco: Never   Vaping Use

## 2024-04-09 ENCOUNTER — HOSPITAL ENCOUNTER (OUTPATIENT)
Dept: WOMENS IMAGING | Age: 79
Discharge: HOME | End: 2024-04-11
Attending: FAMILY MEDICINE
Payer: MEDICARE

## 2024-04-09 DIAGNOSIS — Z12.31 ENCOUNTER FOR SCREENING MAMMOGRAM FOR MALIGNANT NEOPLASM OF BREAST: ICD-10-CM

## 2024-04-09 PROCEDURE — 77063 BREAST TOMOSYNTHESIS BI: CPT

## 2024-05-10 DIAGNOSIS — L65.9 HAIR THINNING: ICD-10-CM

## 2024-05-10 DIAGNOSIS — L60.3 NAIL BRITTLENESS: ICD-10-CM

## 2024-05-10 DIAGNOSIS — E55.9 VITAMIN D DEFICIENCY: ICD-10-CM

## 2024-05-10 DIAGNOSIS — E44.1 MILD PROTEIN-CALORIE MALNUTRITION (HCC): ICD-10-CM

## 2024-05-10 DIAGNOSIS — R10.9 ABDOMINAL PAIN, UNSPECIFIED ABDOMINAL LOCATION: ICD-10-CM

## 2024-05-10 DIAGNOSIS — R79.89 ABNORMAL TSH: Primary | ICD-10-CM

## 2024-05-10 DIAGNOSIS — E11.65 UNCONTROLLED TYPE 2 DIABETES MELLITUS WITH HYPERGLYCEMIA (HCC): ICD-10-CM

## 2024-05-10 LAB
ANION GAP SERPL CALCULATED.3IONS-SCNC: 12 MEQ/L (ref 9–15)
BASOPHILS # BLD: 0.1 K/UL (ref 0–0.2)
BASOPHILS NFR BLD: 1.5 %
BUN SERPL-MCNC: 14 MG/DL (ref 8–23)
CALCIUM SERPL-MCNC: 9.2 MG/DL (ref 8.5–9.9)
CHLORIDE SERPL-SCNC: 103 MEQ/L (ref 95–107)
CO2 SERPL-SCNC: 27 MEQ/L (ref 20–31)
CREAT SERPL-MCNC: 0.73 MG/DL (ref 0.5–0.9)
EOSINOPHIL # BLD: 0.2 K/UL (ref 0–0.7)
EOSINOPHIL NFR BLD: 4 %
ERYTHROCYTE [DISTWIDTH] IN BLOOD BY AUTOMATED COUNT: 12.5 % (ref 11.5–14.5)
ESTIMATED AVERAGE GLUCOSE: 275 MG/DL
GLUCOSE SERPL-MCNC: 266 MG/DL (ref 70–99)
HBA1C MFR BLD: 11.2 % (ref 4–6)
HCT VFR BLD AUTO: 38.5 % (ref 37–47)
HGB BLD-MCNC: 12.6 G/DL (ref 12–16)
LIPASE SERPL-CCNC: 75 U/L (ref 12–95)
LYMPHOCYTES # BLD: 2 K/UL (ref 1–4.8)
LYMPHOCYTES NFR BLD: 37.5 %
MCH RBC QN AUTO: 30.4 PG (ref 27–31.3)
MCHC RBC AUTO-ENTMCNC: 32.7 % (ref 33–37)
MCV RBC AUTO: 92.8 FL (ref 79.4–94.8)
MONOCYTES # BLD: 0.6 K/UL (ref 0.2–0.8)
MONOCYTES NFR BLD: 10.6 %
NEUTROPHILS # BLD: 2.4 K/UL (ref 1.4–6.5)
NEUTS SEG NFR BLD: 46 %
PLATELET # BLD AUTO: 228 K/UL (ref 130–400)
POTASSIUM SERPL-SCNC: 3.9 MEQ/L (ref 3.4–4.9)
RBC # BLD AUTO: 4.15 M/UL (ref 4.2–5.4)
SODIUM SERPL-SCNC: 142 MEQ/L (ref 135–144)
TSH REFLEX: 4.17 UIU/ML (ref 0.44–3.86)
WBC # BLD AUTO: 5.2 K/UL (ref 4.8–10.8)

## 2024-05-11 LAB
FOLATE: >20 NG/ML (ref 4.8–24.2)
VITAMIN B-12: 1451 PG/ML (ref 232–1245)
VITAMIN D 25-HYDROXY: 55.2 NG/ML (ref 30–100)

## 2024-05-21 ENCOUNTER — OFFICE VISIT (OUTPATIENT)
Dept: ENDOCRINOLOGY | Age: 79
End: 2024-05-21
Payer: MEDICARE

## 2024-05-21 VITALS
DIASTOLIC BLOOD PRESSURE: 71 MMHG | WEIGHT: 108.8 LBS | SYSTOLIC BLOOD PRESSURE: 149 MMHG | BODY MASS INDEX: 21.25 KG/M2 | OXYGEN SATURATION: 98 % | HEART RATE: 96 BPM

## 2024-05-21 DIAGNOSIS — E44.1 MILD PROTEIN-CALORIE MALNUTRITION (HCC): ICD-10-CM

## 2024-05-21 DIAGNOSIS — E11.65 UNCONTROLLED TYPE 2 DIABETES MELLITUS WITH HYPERGLYCEMIA (HCC): Primary | ICD-10-CM

## 2024-05-21 DIAGNOSIS — R79.89 ABNORMAL TSH: ICD-10-CM

## 2024-05-21 LAB — T4 FREE SERPL-MCNC: 1.06 NG/DL (ref 0.84–1.68)

## 2024-05-21 PROCEDURE — 3077F SYST BP >= 140 MM HG: CPT | Performed by: INTERNAL MEDICINE

## 2024-05-21 PROCEDURE — 3078F DIAST BP <80 MM HG: CPT | Performed by: INTERNAL MEDICINE

## 2024-05-21 PROCEDURE — 99213 OFFICE O/P EST LOW 20 MIN: CPT | Performed by: INTERNAL MEDICINE

## 2024-05-21 PROCEDURE — 3046F HEMOGLOBIN A1C LEVEL >9.0%: CPT | Performed by: INTERNAL MEDICINE

## 2024-05-21 PROCEDURE — 1123F ACP DISCUSS/DSCN MKR DOCD: CPT | Performed by: INTERNAL MEDICINE

## 2024-05-21 NOTE — PROGRESS NOTES
5/21/2024    Assessment:       Diagnosis Orders   1. Uncontrolled type 2 diabetes mellitus with hyperglycemia (HCC)              PLAN:     Orders Placed This Encounter   Procedures    Basic Metabolic Panel     Standing Status:   Future     Standing Expiration Date:   5/21/2025    Hemoglobin A1C     Standing Status:   Future     Standing Expiration Date:   5/21/2025     Orders Placed This Encounter   Medications    SITagliptin (JANUVIA) 100 MG tablet     Sig: TAKE 1 TABLET DAILY     Dispense:  90 tablet     Refill:  3   Continue current dose of glimepiride Januvia and Prandin repeat labs in 3 to 6 months A1c goal of less than 8    Subjective:     Chief Complaint   Patient presents with    Diabetes     Vitals:    05/21/24 1013 05/21/24 1016   BP: (!) 157/70 (!) 149/71   Site: Right Upper Arm Left Upper Arm   Position: Sitting Sitting   Cuff Size: Medium Adult Medium Adult   Pulse: 100 96   SpO2: 98% 98%   Weight: 49.4 kg (108 lb 12.8 oz)      Wt Readings from Last 3 Encounters:   05/21/24 49.4 kg (108 lb 12.8 oz)   03/12/24 50.9 kg (112 lb 3.2 oz)   01/09/24 52.2 kg (115 lb)     BP Readings from Last 3 Encounters:   05/21/24 (!) 149/71   03/12/24 (!) 154/58   01/09/24 134/64       Follow-up on type 2 diabetes on Prandin 2 mg daily glimepiride 2 mg 3 times a day and Januvia 100 mg daily variable compliance with diet patient reluctant to go on insulin or injectable medication A1c has been going up    Hemoglobin A1C       Date                     Value               Ref Range           Status                05/10/2024               11.2 (H)            4.0 - 6.0 %         Final            ----------      Diabetes  She presents for her follow-up diabetic visit. She has type 2 diabetes mellitus. Her disease course has been fluctuating. There are no hypoglycemic associated symptoms. Pertinent negatives for diabetes include no polydipsia and no polyuria. There are no hypoglycemic complications. Symptoms are worsening. Current

## 2024-05-31 ENCOUNTER — TELEPHONE (OUTPATIENT)
Dept: DIABETES SERVICES | Age: 79
End: 2024-05-31

## 2024-05-31 NOTE — PROGRESS NOTES
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 but things have changed  \"  It is hard for her to get out, I will send her some literature.

## 2024-07-09 ENCOUNTER — OFFICE VISIT (OUTPATIENT)
Dept: FAMILY MEDICINE CLINIC | Age: 79
End: 2024-07-09

## 2024-07-09 VITALS
DIASTOLIC BLOOD PRESSURE: 66 MMHG | WEIGHT: 109 LBS | BODY MASS INDEX: 21.4 KG/M2 | HEART RATE: 76 BPM | SYSTOLIC BLOOD PRESSURE: 140 MMHG | OXYGEN SATURATION: 98 % | TEMPERATURE: 97.7 F | HEIGHT: 60 IN

## 2024-07-09 DIAGNOSIS — E78.2 MIXED HYPERLIPIDEMIA: ICD-10-CM

## 2024-07-09 DIAGNOSIS — E11.3213 TYPE 2 DIABETES MELLITUS WITH BOTH EYES AFFECTED BY MILD NONPROLIFERATIVE RETINOPATHY AND MACULAR EDEMA, WITHOUT LONG-TERM CURRENT USE OF INSULIN (HCC): ICD-10-CM

## 2024-07-09 DIAGNOSIS — S32.010A CLOSED COMPRESSION FRACTURE OF BODY OF L1 VERTEBRA (HCC): Primary | ICD-10-CM

## 2024-07-09 DIAGNOSIS — I10 ESSENTIAL HYPERTENSION: ICD-10-CM

## 2024-07-09 RX ORDER — SODIUM FLUORIDE 6.1 MG/ML
GEL, DENTIFRICE DENTAL
COMMUNITY
Start: 2024-06-06

## 2024-07-09 RX ORDER — AMLODIPINE BESYLATE 2.5 MG/1
TABLET ORAL
Qty: 270 TABLET | Refills: 3 | Status: SHIPPED | OUTPATIENT
Start: 2024-07-09

## 2024-07-09 RX ORDER — PRAVASTATIN SODIUM 40 MG
40 TABLET ORAL
Qty: 36 TABLET | Refills: 3 | Status: SHIPPED | OUTPATIENT
Start: 2024-07-10

## 2024-07-09 RX ORDER — LIDOCAINE 50 MG/G
1 PATCH TOPICAL DAILY
Qty: 10 PATCH | Refills: 0 | Status: SHIPPED | OUTPATIENT
Start: 2024-07-09 | End: 2024-07-19

## 2024-07-09 RX ORDER — LOSARTAN POTASSIUM 100 MG/1
100 TABLET ORAL DAILY
Qty: 90 TABLET | Refills: 3 | Status: SHIPPED | OUTPATIENT
Start: 2024-07-09

## 2024-07-09 RX ORDER — HYDROCHLOROTHIAZIDE 25 MG/1
25 TABLET ORAL DAILY
Qty: 90 TABLET | Refills: 3 | Status: SHIPPED | OUTPATIENT
Start: 2024-07-09

## 2024-07-09 NOTE — PROGRESS NOTES
Chief Complaint   Patient presents with    6 Month Follow-Up     Patient has been noticing wrist pain.    Cough     Concerned about cough, feeling weak. Fractured ribbed area (left rib cage), having continuous pain.    Alopecia     Brittle & ridge nails along with hair thinning & hair loss.    Abdominal Pain     Stomach swelling, feels like she's pregnant. Take hydrochlorothiazide but gives her bad salty cravings. Stomach discomfort.         HPI: Gabbie Barrios 78 y.o. female presenting for     Compression fracture   Patient was seen by another provider several months ago.  At that time she had an x-ray that showed a compression fracture in L1.  Since then patient has been having pain in the back that radiates the abdomen.  Patient has been taking Tylenol that helps with the pain and when the pain is that is where she would take NSAIDs.  Patient reports coughing makes it worse.  Does not need any cough medicine at this time.  Wants to make provider aware.      Hair thinning   On the temporal region and on the top is more pronounced  Admits to nails brittle   Chronic issue for patient.  Patient has been taking the supplements and would not want to take additional medication.    Arthritis   Occasionally painful   Has deviation in the hands and in the feet   Patient reports that she has not had any cramping in the hands. \    Diabetes type 2  Patient follows endocrinology.  Uncontrolled.  Patient is not checking her sugars.  Per endocrinology notes patient takes Januvia, Actos, Prandin, and glimepiride.  Admits to some hypoglycemic episodes which improved with eating or drinking sugary drinks.  Is active in her garden.  Recently had her feet checked by her endocrinologist.  Is scheduled to have her eyes checked (no history of diabetic neuropathy or retinopathy).      Follow-up  Uncontrolled. Patient does not check her sugar levels.  Managed by endocrinology  Did talk to the diabetic educator. Was watching her carb

## 2024-07-10 ENCOUNTER — TELEPHONE (OUTPATIENT)
Dept: DIABETES SERVICES | Age: 79
End: 2024-07-10

## 2024-08-20 ENCOUNTER — TELEMEDICINE (OUTPATIENT)
Dept: FAMILY MEDICINE CLINIC | Age: 79
End: 2024-08-20

## 2024-08-20 DIAGNOSIS — Z00.00 MEDICARE ANNUAL WELLNESS VISIT, SUBSEQUENT: Primary | ICD-10-CM

## 2024-08-20 ASSESSMENT — PATIENT HEALTH QUESTIONNAIRE - PHQ9
2. FEELING DOWN, DEPRESSED OR HOPELESS: NOT AT ALL
SUM OF ALL RESPONSES TO PHQ QUESTIONS 1-9: 0
SUM OF ALL RESPONSES TO PHQ9 QUESTIONS 1 & 2: 0
SUM OF ALL RESPONSES TO PHQ QUESTIONS 1-9: 0
1. LITTLE INTEREST OR PLEASURE IN DOING THINGS: NOT AT ALL

## 2024-08-20 ASSESSMENT — LIFESTYLE VARIABLES: HOW OFTEN DO YOU HAVE A DRINK CONTAINING ALCOHOL: NEVER

## 2024-08-20 NOTE — PROGRESS NOTES
30 minutes in totalMedicare Annual Wellness Visit    Gabbie Barrios is here for No chief complaint on file.    Assessment & Plan   Medicare annual wellness visit, subsequent  Recommendations for Preventive Services Due: see orders and patient instructions/AVS.  Recommended screening schedule for the next 5-10 years is provided to the patient in written form: see Patient Instructions/AVS.     Return in 1 year (on 8/20/2025) for Medicare AWV.     Subjective   The following acute and/or chronic problems were also addressed today:  None     Patient's complete Health Risk Assessment and screening values have been reviewed and are found in Flowsheets. The following problems were reviewed today and where indicated follow up appointments were made and/or referrals ordered.    No Positive Risk Factors identified today.                                    Objective    Patient-Reported Vitals  No data recorded               Allergies   Allergen Reactions    Adhesive Tape      Other reaction(s): Other: See Comments  Peels skin off     Avapro [Irbesartan]     Eggs [Egg White]     Lipitor     Morphine     Pcn [Penicillins]     Statins      High dose of anything--->achy  Other reaction(s): Myalgia  High dose of anything--->achy     Prior to Visit Medications    Medication Sig Taking? Authorizing Provider   PREVIDENT 5000 DRY MOUTH 1.1 % GEL use daily as directed.  Provider, MD Ssuan   amLODIPine (NORVASC) 2.5 MG tablet Take 2 tablets PO in the morning and 1 tablet PO in the evening.  Jose Rafael Aldana MD   hydroCHLOROthiazide (HYDRODIURIL) 25 MG tablet Take 1 tablet by mouth daily As needed  Jose Rafael Aldana MD   losartan (COZAAR) 100 MG tablet Take 1 tablet by mouth daily  Jose Rafael Aldana MD   pravastatin (PRAVACHOL) 40 MG tablet Take 1 tablet by mouth three times a week Take 1 tab mon , wed, friday  Jose Rafael Aldana MD   SITagliptin (JANUVIA) 100 MG tablet TAKE 1 TABLET DAILY  Wesley Velez MD   glimepiride

## 2024-10-10 ENCOUNTER — OFFICE VISIT (OUTPATIENT)
Dept: FAMILY MEDICINE CLINIC | Age: 79
End: 2024-10-10
Payer: MEDICARE

## 2024-10-10 VITALS
DIASTOLIC BLOOD PRESSURE: 60 MMHG | HEART RATE: 100 BPM | WEIGHT: 107.2 LBS | HEIGHT: 60 IN | TEMPERATURE: 97.5 F | OXYGEN SATURATION: 98 % | BODY MASS INDEX: 21.05 KG/M2 | SYSTOLIC BLOOD PRESSURE: 138 MMHG

## 2024-10-10 DIAGNOSIS — R07.81 RIB PAIN ON LEFT SIDE: ICD-10-CM

## 2024-10-10 DIAGNOSIS — M79.674 PAIN OF RIGHT GREAT TOE: Primary | ICD-10-CM

## 2024-10-10 DIAGNOSIS — Z78.0 POST-MENOPAUSAL: ICD-10-CM

## 2024-10-10 DIAGNOSIS — S32.010A CLOSED COMPRESSION FRACTURE OF BODY OF L1 VERTEBRA (HCC): ICD-10-CM

## 2024-10-10 PROCEDURE — 3075F SYST BP GE 130 - 139MM HG: CPT | Performed by: FAMILY MEDICINE

## 2024-10-10 PROCEDURE — 1123F ACP DISCUSS/DSCN MKR DOCD: CPT | Performed by: FAMILY MEDICINE

## 2024-10-10 PROCEDURE — 99213 OFFICE O/P EST LOW 20 MIN: CPT | Performed by: FAMILY MEDICINE

## 2024-10-10 PROCEDURE — 3078F DIAST BP <80 MM HG: CPT | Performed by: FAMILY MEDICINE

## 2024-10-10 NOTE — PROGRESS NOTES
Chief Complaint   Patient presents with    Follow-up    Abdominal Pain     Left abdominal pain. Hx of fracture on back that she is still healing from     Health Maintenance     Pt received in mail from insurance that its recommended to have a bone density scan & pt wants to further discuss this        HPI: Gabbie Barrios 79 y.o. female presenting for     History of ingrown toenail   Patient hsa seen the podiatrist in the past and it was treated  Patient is concerned about gout and wants to have a uric acid level done.     Left sided abdominal pain   Patient is complaining of left flank pain   Patient had a compression fracture in the back that is taking a long time to heal  Lidocaine patches does help with the pain.   Has tried stretching exercises to see if it help  Paitent does admit it is getting better. Patient does not have pain currently but when it does come it is a 2/10.      Compression fracture   Patient was seen by another provider several months ago.  At that time she had an x-ray that showed a compression fracture in L1.  Since then patient has been having pain in the back that radiates the abdomen.  Patient has been taking Tylenol that helps with the pain and when the pain is that is where she would take NSAIDs.  Patient reports coughing makes it worse.  Does not need any cough medicine at this time.  Wants to make provider aware.      Current Outpatient Medications   Medication Sig Dispense Refill    MAGNESIUM CITRATE PO Take by mouth once Take one capsule 4 times a week      PREVIDENT 5000 DRY MOUTH 1.1 % GEL use daily as directed.      amLODIPine (NORVASC) 2.5 MG tablet Take 2 tablets PO in the morning and 1 tablet PO in the evening. 270 tablet 3    hydroCHLOROthiazide (HYDRODIURIL) 25 MG tablet Take 1 tablet by mouth daily As needed 90 tablet 3    losartan (COZAAR) 100 MG tablet Take 1 tablet by mouth daily 90 tablet 3    pravastatin (PRAVACHOL) 40 MG tablet Take 1 tablet by mouth three times a

## 2024-10-22 ENCOUNTER — HOSPITAL ENCOUNTER (OUTPATIENT)
Dept: WOMENS IMAGING | Age: 79
Discharge: HOME | End: 2024-10-24
Attending: FAMILY MEDICINE
Payer: MEDICARE

## 2024-10-22 DIAGNOSIS — Z78.0 POST-MENOPAUSAL: ICD-10-CM

## 2024-10-22 PROCEDURE — 77080 DXA BONE DENSITY AXIAL: CPT

## 2024-11-08 DIAGNOSIS — E11.65 UNCONTROLLED TYPE 2 DIABETES MELLITUS WITH HYPERGLYCEMIA (HCC): ICD-10-CM

## 2024-11-08 DIAGNOSIS — M79.674 PAIN OF RIGHT GREAT TOE: ICD-10-CM

## 2024-11-08 LAB
ANION GAP SERPL CALCULATED.3IONS-SCNC: 12 MEQ/L (ref 9–15)
BUN SERPL-MCNC: 21 MG/DL (ref 8–23)
CALCIUM SERPL-MCNC: 9.6 MG/DL (ref 8.5–9.9)
CHLORIDE SERPL-SCNC: 101 MEQ/L (ref 95–107)
CO2 SERPL-SCNC: 27 MEQ/L (ref 20–31)
CREAT SERPL-MCNC: 0.79 MG/DL (ref 0.5–0.9)
ESTIMATED AVERAGE GLUCOSE: 252 MG/DL
GLUCOSE SERPL-MCNC: 164 MG/DL (ref 70–99)
HBA1C MFR BLD: 10.4 % (ref 4–6)
POTASSIUM SERPL-SCNC: 4.3 MEQ/L (ref 3.4–4.9)
SODIUM SERPL-SCNC: 140 MEQ/L (ref 135–144)
URATE SERPL-MCNC: 2.3 MG/DL (ref 2.4–5.7)

## 2024-11-19 ENCOUNTER — OFFICE VISIT (OUTPATIENT)
Dept: ENDOCRINOLOGY | Age: 79
End: 2024-11-19
Payer: MEDICARE

## 2024-11-19 VITALS
OXYGEN SATURATION: 97 % | SYSTOLIC BLOOD PRESSURE: 145 MMHG | HEIGHT: 60 IN | BODY MASS INDEX: 21.01 KG/M2 | WEIGHT: 107 LBS | DIASTOLIC BLOOD PRESSURE: 71 MMHG

## 2024-11-19 DIAGNOSIS — E11.65 UNCONTROLLED TYPE 2 DIABETES MELLITUS WITH HYPERGLYCEMIA (HCC): Primary | ICD-10-CM

## 2024-11-19 PROCEDURE — 3077F SYST BP >= 140 MM HG: CPT | Performed by: INTERNAL MEDICINE

## 2024-11-19 PROCEDURE — 3046F HEMOGLOBIN A1C LEVEL >9.0%: CPT | Performed by: INTERNAL MEDICINE

## 2024-11-19 PROCEDURE — 1159F MED LIST DOCD IN RCRD: CPT | Performed by: INTERNAL MEDICINE

## 2024-11-19 PROCEDURE — 3078F DIAST BP <80 MM HG: CPT | Performed by: INTERNAL MEDICINE

## 2024-11-19 PROCEDURE — 1123F ACP DISCUSS/DSCN MKR DOCD: CPT | Performed by: INTERNAL MEDICINE

## 2024-11-19 PROCEDURE — 99213 OFFICE O/P EST LOW 20 MIN: CPT | Performed by: INTERNAL MEDICINE

## 2024-11-19 NOTE — PROGRESS NOTES
11/19/2024    Assessment:       Diagnosis Orders   1. Uncontrolled type 2 diabetes mellitus with hyperglycemia (HCC)              PLAN:     Orders Placed This Encounter   Procedures    Hemoglobin A1C     Standing Status:   Future     Standing Expiration Date:   11/19/2025    Basic Metabolic Panel     Standing Status:   Future     Standing Expiration Date:   11/19/2025    Microalbumin / Creatinine Urine Ratio     Standing Status:   Future     Standing Expiration Date:   11/19/2025    Lipid Panel     Standing Status:   Future     Standing Expiration Date:   11/19/2025     continue current medication regimen for diabetes A1c goal of less than 9      No orders of the defined types were placed in this encounter.    No orders of the defined types were placed in this encounter.    No follow-ups on file.  Subjective:     Chief Complaint   Patient presents with    Diabetes     Vitals:    11/19/24 1008 11/19/24 1011   BP: (!) 145/71 (!) 145/71   SpO2: 97%    Weight: 48.5 kg (107 lb)    Height: 1.524 m (5')      Wt Readings from Last 3 Encounters:   11/19/24 48.5 kg (107 lb)   10/10/24 48.6 kg (107 lb 3.2 oz)   07/09/24 49.4 kg (109 lb)     BP Readings from Last 3 Encounters:   11/19/24 (!) 145/71   10/10/24 138/60   07/09/24 (!) 140/66       Follow-up on type 2 diabetes with variable compliance with testing and diet hemoglobin A1c was 10.4 currently on glimepiride 2 mg 3 times a day Januvia 100 mg daily Prandin 2 mg daily A1c has improved slightly patient reluctant to go on insulin overall sugars in the 250+ range      Hemoglobin A1C       Date                     Value               Ref Range           Status                11/08/2024               10.4 (H)            4.0 - 6.0 %         Final            ----------      Diabetes  She presents for her follow-up diabetic visit. She has type 2 diabetes mellitus. Associated symptoms include fatigue. Symptoms are improving. Current diabetic treatment includes oral agent (triple

## 2024-11-20 ASSESSMENT — ENCOUNTER SYMPTOMS: EYES NEGATIVE: 1

## 2024-11-25 DIAGNOSIS — E11.65 UNCONTROLLED TYPE 2 DIABETES MELLITUS WITH HYPERGLYCEMIA (HCC): ICD-10-CM

## 2024-11-26 RX ORDER — GLIMEPIRIDE 2 MG/1
TABLET ORAL
Qty: 270 TABLET | Refills: 3 | Status: SHIPPED | OUTPATIENT
Start: 2024-11-26

## 2025-01-07 ENCOUNTER — OFFICE VISIT (OUTPATIENT)
Age: 80
End: 2025-01-07
Payer: MEDICARE

## 2025-01-07 VITALS
OXYGEN SATURATION: 98 % | HEIGHT: 60 IN | HEART RATE: 80 BPM | DIASTOLIC BLOOD PRESSURE: 64 MMHG | SYSTOLIC BLOOD PRESSURE: 128 MMHG | WEIGHT: 106 LBS | BODY MASS INDEX: 20.81 KG/M2 | TEMPERATURE: 97.8 F

## 2025-01-07 DIAGNOSIS — Z86.19 H/O COLD SORES: ICD-10-CM

## 2025-01-07 DIAGNOSIS — E11.3213 TYPE 2 DIABETES MELLITUS WITH BOTH EYES AFFECTED BY MILD NONPROLIFERATIVE RETINOPATHY AND MACULAR EDEMA, WITHOUT LONG-TERM CURRENT USE OF INSULIN (HCC): ICD-10-CM

## 2025-01-07 DIAGNOSIS — Z78.0 POST-MENOPAUSAL: ICD-10-CM

## 2025-01-07 DIAGNOSIS — E78.2 MIXED HYPERLIPIDEMIA: ICD-10-CM

## 2025-01-07 DIAGNOSIS — H35.30 MACULAR DEGENERATION, UNSPECIFIED LATERALITY, UNSPECIFIED TYPE: ICD-10-CM

## 2025-01-07 DIAGNOSIS — I10 ESSENTIAL HYPERTENSION: ICD-10-CM

## 2025-01-07 DIAGNOSIS — S32.010A CLOSED COMPRESSION FRACTURE OF BODY OF L1 VERTEBRA (HCC): Primary | ICD-10-CM

## 2025-01-07 PROCEDURE — 3074F SYST BP LT 130 MM HG: CPT | Performed by: FAMILY MEDICINE

## 2025-01-07 PROCEDURE — 3078F DIAST BP <80 MM HG: CPT | Performed by: FAMILY MEDICINE

## 2025-01-07 PROCEDURE — 1123F ACP DISCUSS/DSCN MKR DOCD: CPT | Performed by: FAMILY MEDICINE

## 2025-01-07 PROCEDURE — 1159F MED LIST DOCD IN RCRD: CPT | Performed by: FAMILY MEDICINE

## 2025-01-07 PROCEDURE — G2211 COMPLEX E/M VISIT ADD ON: HCPCS | Performed by: FAMILY MEDICINE

## 2025-01-07 PROCEDURE — 99214 OFFICE O/P EST MOD 30 MIN: CPT | Performed by: FAMILY MEDICINE

## 2025-01-07 RX ORDER — LOSARTAN POTASSIUM 100 MG/1
100 TABLET ORAL DAILY
Qty: 90 TABLET | Refills: 3 | Status: SHIPPED | OUTPATIENT
Start: 2025-01-07

## 2025-01-07 RX ORDER — PRAVASTATIN SODIUM 40 MG
40 TABLET ORAL
Qty: 36 TABLET | Refills: 3 | Status: SHIPPED | OUTPATIENT
Start: 2025-01-08

## 2025-01-07 RX ORDER — AMLODIPINE BESYLATE 2.5 MG/1
TABLET ORAL
Qty: 270 TABLET | Refills: 3 | Status: SHIPPED | OUTPATIENT
Start: 2025-01-07

## 2025-01-07 RX ORDER — HYDROCHLOROTHIAZIDE 25 MG/1
25 TABLET ORAL DAILY
Qty: 90 TABLET | Refills: 3 | Status: SHIPPED | OUTPATIENT
Start: 2025-01-07

## 2025-01-07 ASSESSMENT — PATIENT HEALTH QUESTIONNAIRE - PHQ9
SUM OF ALL RESPONSES TO PHQ QUESTIONS 1-9: 0
SUM OF ALL RESPONSES TO PHQ9 QUESTIONS 1 & 2: 0
1. LITTLE INTEREST OR PLEASURE IN DOING THINGS: NOT AT ALL
SUM OF ALL RESPONSES TO PHQ QUESTIONS 1-9: 0
2. FEELING DOWN, DEPRESSED OR HOPELESS: NOT AT ALL

## 2025-01-07 NOTE — PROGRESS NOTES
Chief Complaint   Patient presents with    6 Month Follow-Up     Patient just wants to discuss her back pains, still healing. Patient wants to discuss hair still falling out and nails breaking        HPI: Gabbie Barrios 79 y.o. female presenting for   History of Present Illness  The patient presents for evaluation of back pain, hair thinning, and type 2 diabetes.    Compression fracture   Patient was seen by another provider several months ago.  At that time she had an x-ray that showed a compression fracture in L1.  Since then patient has been having pain in the back that radiates the abdomen.  Patient has been taking Tylenol that helps with the pain and when the pain is that is where she would take NSAIDs.  Patient reports coughing makes it worse.  Does not need any cough medicine at this time.  Wants to make provider aware.    F/u   She has been utilizing an abdominal binder for additional support, which has significantly improved her posture and alleviated her back pain. She reports a noticeable improvement in her left-sided pain, now only experiencing a minor twinge. She also mentions discomfort in the area of her floating ribs, suspecting it may be related to arthritis. She has declined physical therapy, opting instead for home exercises that have proven beneficial in managing her back pain. She also practices wall stretching exercises, which have been effective in maintaining proper alignment and preventing compression. On particularly strenuous days, she resorts to Tylenol for pain management, which she finds helpful.    Hair thinning  She continues to experience hair thinning and has expressed interest in trying Nutrafol, a product she has seen advertised on television. She is currently taking vitamins for hair, skin, and nails but has not observed any significant improvement. She has made modifications to her diet and is considering further changes.    Kidney maintenance  She has received a letter from her

## 2025-02-20 ENCOUNTER — TELEPHONE (OUTPATIENT)
Age: 80
End: 2025-02-20

## 2025-02-20 DIAGNOSIS — Z12.31 ENCOUNTER FOR SCREENING MAMMOGRAM FOR MALIGNANT NEOPLASM OF BREAST: Primary | ICD-10-CM

## 2025-02-20 NOTE — TELEPHONE ENCOUNTER
Patient calling in requesting order for mammogram. No issues, just needs yearly.   Last test was done 4/9/24    Patient phone [phone]    LOV 1/7/25 (patient states sorry she forgot to mention this at last appt)

## 2025-04-14 ENCOUNTER — HOSPITAL ENCOUNTER (OUTPATIENT)
Dept: WOMENS IMAGING | Age: 80
Discharge: HOME | End: 2025-04-16
Attending: FAMILY MEDICINE
Payer: MEDICARE

## 2025-04-14 DIAGNOSIS — Z12.31 ENCOUNTER FOR SCREENING MAMMOGRAM FOR MALIGNANT NEOPLASM OF BREAST: ICD-10-CM

## 2025-04-14 PROCEDURE — 77063 BREAST TOMOSYNTHESIS BI: CPT

## 2025-04-15 ENCOUNTER — RESULTS FOLLOW-UP (OUTPATIENT)
Age: 80
End: 2025-04-15

## 2025-05-09 DIAGNOSIS — E11.65 UNCONTROLLED TYPE 2 DIABETES MELLITUS WITH HYPERGLYCEMIA (HCC): ICD-10-CM

## 2025-05-09 LAB
ANION GAP SERPL CALCULATED.3IONS-SCNC: 12 MEQ/L (ref 9–15)
BUN SERPL-MCNC: 22 MG/DL (ref 8–23)
CALCIUM SERPL-MCNC: 9.1 MG/DL (ref 8.5–9.9)
CHLORIDE SERPL-SCNC: 101 MEQ/L (ref 95–107)
CHOLEST SERPL-MCNC: 181 MG/DL (ref 0–199)
CO2 SERPL-SCNC: 27 MEQ/L (ref 20–31)
CREAT SERPL-MCNC: 0.84 MG/DL (ref 0.5–0.9)
CREAT UR-MCNC: 66.2 MG/DL
ESTIMATED AVERAGE GLUCOSE: 266 MG/DL
GLUCOSE SERPL-MCNC: 173 MG/DL (ref 70–99)
HBA1C MFR BLD: 10.9 % (ref 4–6)
HDLC SERPL-MCNC: 78 MG/DL (ref 40–59)
LDLC SERPL CALC-MCNC: 91 MG/DL (ref 0–129)
MICROALBUMIN UR-MCNC: 3.8 MG/DL
MICROALBUMIN/CREAT UR-RTO: 57.4 MG/G (ref 0–30)
POTASSIUM SERPL-SCNC: 3.7 MEQ/L (ref 3.4–4.9)
SODIUM SERPL-SCNC: 140 MEQ/L (ref 135–144)
TRIGL SERPL-MCNC: 60 MG/DL (ref 0–150)

## 2025-05-20 ENCOUNTER — OFFICE VISIT (OUTPATIENT)
Age: 80
End: 2025-05-20
Payer: MEDICARE

## 2025-05-20 VITALS
BODY MASS INDEX: 20.62 KG/M2 | HEIGHT: 60 IN | HEART RATE: 80 BPM | DIASTOLIC BLOOD PRESSURE: 63 MMHG | SYSTOLIC BLOOD PRESSURE: 145 MMHG | WEIGHT: 105 LBS

## 2025-05-20 DIAGNOSIS — E11.65 UNCONTROLLED TYPE 2 DIABETES MELLITUS WITH HYPERGLYCEMIA (HCC): Primary | ICD-10-CM

## 2025-05-20 PROCEDURE — 3078F DIAST BP <80 MM HG: CPT | Performed by: INTERNAL MEDICINE

## 2025-05-20 PROCEDURE — 99213 OFFICE O/P EST LOW 20 MIN: CPT | Performed by: INTERNAL MEDICINE

## 2025-05-20 PROCEDURE — 3077F SYST BP >= 140 MM HG: CPT | Performed by: INTERNAL MEDICINE

## 2025-05-20 PROCEDURE — 1126F AMNT PAIN NOTED NONE PRSNT: CPT | Performed by: INTERNAL MEDICINE

## 2025-05-20 PROCEDURE — 3046F HEMOGLOBIN A1C LEVEL >9.0%: CPT | Performed by: INTERNAL MEDICINE

## 2025-05-20 PROCEDURE — 1123F ACP DISCUSS/DSCN MKR DOCD: CPT | Performed by: INTERNAL MEDICINE

## 2025-05-20 PROCEDURE — 1159F MED LIST DOCD IN RCRD: CPT | Performed by: INTERNAL MEDICINE

## 2025-05-20 NOTE — PROGRESS NOTES
5/20/2025    Assessment:       Diagnosis Orders   1. Uncontrolled type 2 diabetes mellitus with hyperglycemia (HCC)              PLAN:     Orders Placed This Encounter   Procedures    Basic Metabolic Panel     Standing Status:   Future     Expected Date:   5/20/2025     Expiration Date:   5/20/2026    Hemoglobin A1C     Standing Status:   Future     Expected Date:   5/20/2025     Expiration Date:   5/20/2026     Orders Placed This Encounter   Medications    SITagliptin (JANUVIA) 100 MG tablet     Sig: TAKE 1 TABLET DAILY     Dispense:  90 tablet     Refill:  3   Continue current dose of Januvia glimepiride Prandin follow-up in 3 to 6 months time A1c goal of less than 9    Subjective:     Chief Complaint   Patient presents with    Diabetes     Vitals:    05/20/25 1047 05/20/25 1052   BP: (!) 158/58 (!) 145/63   Pulse: 80    Weight: 47.6 kg (105 lb)    Height: 1.524 m (5')      Wt Readings from Last 3 Encounters:   05/20/25 47.6 kg (105 lb)   01/07/25 48.1 kg (106 lb)   11/19/24 48.5 kg (107 lb)     BP Readings from Last 3 Encounters:   05/20/25 (!) 145/63   01/07/25 128/64   11/19/24 (!) 145/71       Follow-up with type 2 diabetes patient on oral medications does not test her sugars A1c has been more than reluctant to go on insulin on glimepiride 2 mg 3 times a day Januvia 100 mg daily Prandin 2 mg daily    Hemoglobin A1C       Date                     Value               Ref Range           Status                05/09/2025               10.9 (H)            4.0 - 6.0 %         Final            ----------      Diabetes  She presents for her follow-up diabetic visit. She has type 2 diabetes mellitus. There are no hypoglycemic complications. Symptoms are worsening. Current diabetic treatment includes oral agent (triple therapy). When asked about meal planning, she reported none. Her overall blood glucose range is >200 mg/dl.     Past Medical History:   Diagnosis Date    Anemia     Fatigue     Fibroids     Hearing loss

## 2025-05-28 ASSESSMENT — ENCOUNTER SYMPTOMS: EYES NEGATIVE: 1

## 2025-06-10 ENCOUNTER — OFFICE VISIT (OUTPATIENT)
Age: 80
End: 2025-06-10
Payer: MEDICARE

## 2025-06-10 VITALS
SYSTOLIC BLOOD PRESSURE: 138 MMHG | HEIGHT: 60 IN | DIASTOLIC BLOOD PRESSURE: 64 MMHG | TEMPERATURE: 97.3 F | WEIGHT: 105 LBS | HEART RATE: 85 BPM | BODY MASS INDEX: 20.62 KG/M2 | OXYGEN SATURATION: 99 %

## 2025-06-10 DIAGNOSIS — M25.572 ACUTE LEFT ANKLE PAIN: ICD-10-CM

## 2025-06-10 DIAGNOSIS — T14.90XA INJURY: ICD-10-CM

## 2025-06-10 DIAGNOSIS — M79.672 LEFT FOOT PAIN: Primary | ICD-10-CM

## 2025-06-10 PROCEDURE — 1123F ACP DISCUSS/DSCN MKR DOCD: CPT | Performed by: NURSE PRACTITIONER

## 2025-06-10 PROCEDURE — 99213 OFFICE O/P EST LOW 20 MIN: CPT | Performed by: NURSE PRACTITIONER

## 2025-06-10 PROCEDURE — 1159F MED LIST DOCD IN RCRD: CPT | Performed by: NURSE PRACTITIONER

## 2025-06-10 PROCEDURE — 3075F SYST BP GE 130 - 139MM HG: CPT | Performed by: NURSE PRACTITIONER

## 2025-06-10 PROCEDURE — 3078F DIAST BP <80 MM HG: CPT | Performed by: NURSE PRACTITIONER

## 2025-06-10 SDOH — ECONOMIC STABILITY: FOOD INSECURITY: WITHIN THE PAST 12 MONTHS, YOU WORRIED THAT YOUR FOOD WOULD RUN OUT BEFORE YOU GOT MONEY TO BUY MORE.: NEVER TRUE

## 2025-06-10 SDOH — ECONOMIC STABILITY: FOOD INSECURITY: WITHIN THE PAST 12 MONTHS, THE FOOD YOU BOUGHT JUST DIDN'T LAST AND YOU DIDN'T HAVE MONEY TO GET MORE.: NEVER TRUE

## 2025-06-12 ENCOUNTER — TELEPHONE (OUTPATIENT)
Age: 80
End: 2025-06-12

## 2025-06-12 NOTE — TELEPHONE ENCOUNTER
Patient spouse called asking about the results of the x ray done on the 10th. Patient spouse and patient would like a call as soon as the results are available.

## 2025-06-13 ENCOUNTER — RESULTS FOLLOW-UP (OUTPATIENT)
Age: 80
End: 2025-06-13

## 2025-06-13 DIAGNOSIS — R93.89 ABNORMAL X-RAY: Primary | ICD-10-CM

## 2025-06-13 DIAGNOSIS — M25.572 ACUTE LEFT ANKLE PAIN: ICD-10-CM
